# Patient Record
Sex: MALE | Race: BLACK OR AFRICAN AMERICAN | NOT HISPANIC OR LATINO | URBAN - METROPOLITAN AREA
[De-identification: names, ages, dates, MRNs, and addresses within clinical notes are randomized per-mention and may not be internally consistent; named-entity substitution may affect disease eponyms.]

---

## 2020-11-24 ENCOUNTER — EMERGENCY (EMERGENCY)
Facility: HOSPITAL | Age: 25
LOS: 0 days | Discharge: HOME | End: 2020-11-24
Payer: COMMERCIAL

## 2020-11-24 VITALS
HEART RATE: 97 BPM | DIASTOLIC BLOOD PRESSURE: 99 MMHG | RESPIRATION RATE: 16 BRPM | OXYGEN SATURATION: 97 % | WEIGHT: 229.94 LBS | HEIGHT: 70 IN | TEMPERATURE: 98 F | SYSTOLIC BLOOD PRESSURE: 150 MMHG

## 2020-11-24 PROCEDURE — 99284 EMERGENCY DEPT VISIT MOD MDM: CPT

## 2020-11-24 RX ORDER — CYCLOBENZAPRINE HYDROCHLORIDE 10 MG/1
1 TABLET, FILM COATED ORAL
Qty: 20 | Refills: 0
Start: 2020-11-24 | End: 2020-12-03

## 2020-11-24 RX ORDER — IBUPROFEN 200 MG
1 TABLET ORAL
Qty: 28 | Refills: 0
Start: 2020-11-24 | End: 2020-11-30

## 2020-11-24 RX ORDER — CYCLOBENZAPRINE HYDROCHLORIDE 10 MG/1
5 TABLET, FILM COATED ORAL ONCE
Refills: 0 | Status: COMPLETED | OUTPATIENT
Start: 2020-11-24 | End: 2020-11-24

## 2020-11-24 RX ORDER — KETOROLAC TROMETHAMINE 30 MG/ML
60 SYRINGE (ML) INJECTION ONCE
Refills: 0 | Status: DISCONTINUED | OUTPATIENT
Start: 2020-11-24 | End: 2020-11-24

## 2020-11-24 RX ADMIN — Medication 60 MILLIGRAM(S): at 20:37

## 2020-11-24 RX ADMIN — CYCLOBENZAPRINE HYDROCHLORIDE 5 MILLIGRAM(S): 10 TABLET, FILM COATED ORAL at 20:37
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
## 2020-11-24 NOTE — ED PROVIDER NOTE - CLINICAL SUMMARY MEDICAL DECISION MAKING FREE TEXT BOX
Due to lack of trauma a fracture is unlikely for this patient. No imaging will be ordered at this time. Pt is instructed to do consevative managament and f/u w a specialist in 2 days, re-evaluation and possible imaging at this point for presistent pain
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
## 2020-11-24 NOTE — ED ADULT NURSE NOTE - NSIMPLEMENTINTERV_GEN_ALL_ED
Implemented All Universal Safety Interventions:  [place] to call system. Call bell, personal items and telephone within reach. Instruct patient to call for assistance. Room bathroom lighting operational. Non-slip footwear when patient is off stretcher. Physically safe environment: no spills, clutter or unnecessary equipment. Stretcher in lowest position, wheels locked, appropriate side rails in place.
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
## 2020-11-24 NOTE — ED PROVIDER NOTE - PATIENT PORTAL LINK FT
You can access the FollowMyHealth Patient Portal offered by [facility] by registering at the following website: [email or web address]. By joining [facility]’s FollowMyHealth portal, you will also be able to view your health information using other applications (apps) compatible with our system.

## 2020-11-25 DIAGNOSIS — S16.1XXA STRAIN OF MUSCLE, FASCIA AND TENDON AT NECK LEVEL, INITIAL ENCOUNTER: ICD-10-CM
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
## 2021-03-18 ENCOUNTER — INPATIENT (INPATIENT)
Facility: HOSPITAL | Age: 26
LOS: 7 days | Discharge: HOME | End: 2021-03-26
Attending: INTERNAL MEDICINE | Admitting: INTERNAL MEDICINE
Payer: SELF-PAY

## 2021-03-18 VITALS
WEIGHT: 259.93 LBS | OXYGEN SATURATION: 99 % | TEMPERATURE: 97 F | HEIGHT: 70 IN | SYSTOLIC BLOOD PRESSURE: 139 MMHG | HEART RATE: 108 BPM | DIASTOLIC BLOOD PRESSURE: 94 MMHG | RESPIRATION RATE: 20 BRPM

## 2021-03-18 DIAGNOSIS — A41.9 SEPSIS, UNSPECIFIED ORGANISM: ICD-10-CM

## 2021-03-18 DIAGNOSIS — R65.21 SEVERE SEPSIS WITH SEPTIC SHOCK: ICD-10-CM

## 2021-03-18 DIAGNOSIS — E87.0 HYPEROSMOLALITY AND HYPERNATREMIA: ICD-10-CM

## 2021-03-18 DIAGNOSIS — E87.5 HYPERKALEMIA: ICD-10-CM

## 2021-03-18 DIAGNOSIS — U07.1 COVID-19: ICD-10-CM

## 2021-03-18 DIAGNOSIS — E86.0 DEHYDRATION: ICD-10-CM

## 2021-03-18 DIAGNOSIS — J96.01 ACUTE RESPIRATORY FAILURE WITH HYPOXIA: ICD-10-CM

## 2021-03-18 DIAGNOSIS — G93.41 METABOLIC ENCEPHALOPATHY: ICD-10-CM

## 2021-03-18 DIAGNOSIS — J15.20 PNEUMONIA DUE TO STAPHYLOCOCCUS, UNSPECIFIED: ICD-10-CM

## 2021-03-18 DIAGNOSIS — E11.11 TYPE 2 DIABETES MELLITUS WITH KETOACIDOSIS WITH COMA: ICD-10-CM

## 2021-03-18 DIAGNOSIS — E11.10 TYPE 2 DIABETES MELLITUS WITH KETOACIDOSIS WITHOUT COMA: ICD-10-CM

## 2021-03-18 DIAGNOSIS — N17.9 ACUTE KIDNEY FAILURE, UNSPECIFIED: ICD-10-CM

## 2021-03-18 LAB
ALBUMIN SERPL ELPH-MCNC: 4.2 G/DL — SIGNIFICANT CHANGE UP (ref 3.3–5)
ALP SERPL-CCNC: 102 U/L — SIGNIFICANT CHANGE UP (ref 40–120)
ALT FLD-CCNC: 29 U/L — SIGNIFICANT CHANGE UP (ref 12–78)
ANION GAP SERPL CALC-SCNC: 29 MMOL/L — HIGH (ref 5–17)
AST SERPL-CCNC: 10 U/L — LOW (ref 15–37)
BASE EXCESS BLDA CALC-SCNC: -18.4 MMOL/L — LOW (ref -2–2)
BILIRUB SERPL-MCNC: 0.5 MG/DL — SIGNIFICANT CHANGE UP (ref 0.2–1.2)
BLOOD GAS COMMENTS: SIGNIFICANT CHANGE UP
BLOOD GAS SOURCE: SIGNIFICANT CHANGE UP
BUN SERPL-MCNC: 104 MG/DL — HIGH (ref 7–23)
CALCIUM SERPL-MCNC: 9.3 MG/DL — SIGNIFICANT CHANGE UP (ref 8.5–10.1)
CHLORIDE SERPL-SCNC: 90 MMOL/L — LOW (ref 96–108)
CO2 SERPL-SCNC: 12 MMOL/L — LOW (ref 22–31)
CREAT SERPL-MCNC: 5.23 MG/DL — HIGH (ref 0.5–1.3)
GLUCOSE BLDC GLUCOMTR-MCNC: >600 MG/DL — CRITICAL HIGH (ref 70–99)
GLUCOSE BLDC GLUCOMTR-MCNC: >600 MG/DL — CRITICAL HIGH (ref 70–99)
GLUCOSE SERPL-MCNC: 1374 MG/DL — CRITICAL HIGH (ref 70–99)
HCO3 BLDA-SCNC: 9 MMOL/L — LOW (ref 21–29)
HCT VFR BLD CALC: 54.1 % — HIGH (ref 39–50)
HGB BLD-MCNC: 16 G/DL — SIGNIFICANT CHANGE UP (ref 13–17)
HOROWITZ INDEX BLDA+IHG-RTO: 0.21 — SIGNIFICANT CHANGE UP
LACTATE SERPL-SCNC: 1.4 MMOL/L — SIGNIFICANT CHANGE UP (ref 0.7–2)
MAGNESIUM SERPL-MCNC: 5.5 MG/DL — HIGH (ref 1.6–2.6)
MCHC RBC-ENTMCNC: 26.8 PG — LOW (ref 27–34)
MCHC RBC-ENTMCNC: 29.6 GM/DL — LOW (ref 32–36)
MCV RBC AUTO: 90.5 FL — SIGNIFICANT CHANGE UP (ref 80–100)
PCO2 BLDA: 25 MMHG — LOW (ref 32–46)
PH BLD: 7.18 — CRITICAL LOW (ref 7.35–7.45)
PHOSPHATE SERPL-MCNC: 8.9 MG/DL — HIGH (ref 2.5–4.5)
PLATELET # BLD AUTO: 250 K/UL — SIGNIFICANT CHANGE UP (ref 150–400)
PO2 BLDA: 120 MMHG — HIGH (ref 74–108)
POTASSIUM SERPL-MCNC: 6.7 MMOL/L — CRITICAL HIGH (ref 3.5–5.3)
POTASSIUM SERPL-SCNC: 6.7 MMOL/L — CRITICAL HIGH (ref 3.5–5.3)
PROT SERPL-MCNC: 8.5 GM/DL — HIGH (ref 6–8.3)
RBC # BLD: 5.98 M/UL — HIGH (ref 4.2–5.8)
RBC # FLD: 14.5 % — SIGNIFICANT CHANGE UP (ref 10.3–14.5)
SAO2 % BLDA: 97 % — HIGH (ref 92–96)
SODIUM SERPL-SCNC: 131 MMOL/L — LOW (ref 135–145)
TSH SERPL-MCNC: 0.6 UIU/ML — SIGNIFICANT CHANGE UP (ref 0.36–3.74)
WBC # BLD: 17.4 K/UL — HIGH (ref 3.8–10.5)
WBC # FLD AUTO: 17.4 K/UL — HIGH (ref 3.8–10.5)

## 2021-03-18 PROCEDURE — 99291 CRITICAL CARE FIRST HOUR: CPT

## 2021-03-18 PROCEDURE — 93010 ELECTROCARDIOGRAM REPORT: CPT

## 2021-03-18 RX ORDER — CALCIUM GLUCONATE 100 MG/ML
1 VIAL (ML) INTRAVENOUS ONCE
Refills: 0 | Status: COMPLETED | OUTPATIENT
Start: 2021-03-18 | End: 2021-03-18

## 2021-03-18 RX ORDER — SODIUM CHLORIDE 9 MG/ML
1000 INJECTION INTRAMUSCULAR; INTRAVENOUS; SUBCUTANEOUS ONCE
Refills: 0 | Status: COMPLETED | OUTPATIENT
Start: 2021-03-18 | End: 2021-03-18

## 2021-03-18 RX ORDER — SODIUM CHLORIDE 9 MG/ML
2000 INJECTION INTRAMUSCULAR; INTRAVENOUS; SUBCUTANEOUS ONCE
Refills: 0 | Status: COMPLETED | OUTPATIENT
Start: 2021-03-18 | End: 2021-03-18

## 2021-03-18 RX ORDER — HALOPERIDOL DECANOATE 100 MG/ML
5 INJECTION INTRAMUSCULAR ONCE
Refills: 0 | Status: COMPLETED | OUTPATIENT
Start: 2021-03-18 | End: 2021-03-18

## 2021-03-18 RX ORDER — INSULIN HUMAN 100 [IU]/ML
17 INJECTION, SOLUTION SUBCUTANEOUS
Qty: 100 | Refills: 0 | Status: DISCONTINUED | OUTPATIENT
Start: 2021-03-18 | End: 2021-03-19

## 2021-03-18 RX ADMIN — Medication 1 GRAM(S): at 00:04

## 2021-03-18 RX ADMIN — Medication 1 MILLIGRAM(S): at 22:00

## 2021-03-18 RX ADMIN — SODIUM CHLORIDE 2000 MILLILITER(S): 9 INJECTION INTRAMUSCULAR; INTRAVENOUS; SUBCUTANEOUS at 22:45

## 2021-03-18 RX ADMIN — HALOPERIDOL DECANOATE 5 MILLIGRAM(S): 100 INJECTION INTRAMUSCULAR at 23:48

## 2021-03-18 RX ADMIN — Medication 100 GRAM(S): at 23:49

## 2021-03-18 RX ADMIN — SODIUM CHLORIDE 1000 MILLILITER(S): 9 INJECTION INTRAMUSCULAR; INTRAVENOUS; SUBCUTANEOUS at 23:05

## 2021-03-18 RX ADMIN — INSULIN HUMAN 17 UNIT(S)/HR: 100 INJECTION, SOLUTION SUBCUTANEOUS at 23:49

## 2021-03-18 RX ADMIN — SODIUM CHLORIDE 1000 MILLILITER(S): 9 INJECTION INTRAMUSCULAR; INTRAVENOUS; SUBCUTANEOUS at 22:01
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
## 2021-03-18 NOTE — ED ADULT TRIAGE NOTE - CHIEF COMPLAINT QUOTE
epidural shot today fs 336 , no hx diabetics, lips dry. epidural shot today fs 336 , no hx diabetics, lips dry.20 left a/c .normal saline 150 ml.

## 2021-03-18 NOTE — ED ADULT NURSE NOTE - NSIMPLEMENTINTERV_GEN_ALL_ED
Implemented All Fall with Harm Risk Interventions:  [place] to call system. Call bell, personal items and telephone within reach. Instruct patient to call for assistance. Room bathroom lighting operational. Non-slip footwear when patient is off stretcher. Physically safe environment: no spills, clutter or unnecessary equipment. Stretcher in lowest position, wheels locked, appropriate side rails in place. Provide visual cue, wrist band, yellow gown, etc. Monitor gait and stability. Monitor for mental status changes and reorient to person, place, and time. Review medications for side effects contributing to fall risk. Reinforce activity limits and safety measures with patient and family. Provide visual clues: red socks.

## 2021-03-19 DIAGNOSIS — E11.11 TYPE 2 DIABETES MELLITUS WITH KETOACIDOSIS WITH COMA: ICD-10-CM

## 2021-03-19 PROBLEM — Z78.9 OTHER SPECIFIED HEALTH STATUS: Chronic | Status: ACTIVE | Noted: 2020-11-24

## 2021-03-19 LAB
A1C WITH ESTIMATED AVERAGE GLUCOSE RESULT: 12.5 % — HIGH (ref 4–5.6)
ANION GAP SERPL CALC-SCNC: 12 MMOL/L — SIGNIFICANT CHANGE UP (ref 5–17)
ANION GAP SERPL CALC-SCNC: 18 MMOL/L — HIGH (ref 5–17)
ANION GAP SERPL CALC-SCNC: 22 MMOL/L — HIGH (ref 5–17)
ANION GAP SERPL CALC-SCNC: 6 MMOL/L — SIGNIFICANT CHANGE UP (ref 5–17)
ANION GAP SERPL CALC-SCNC: 6 MMOL/L — SIGNIFICANT CHANGE UP (ref 5–17)
APPEARANCE UR: ABNORMAL
APPEARANCE UR: CLEAR — SIGNIFICANT CHANGE UP
BACTERIA # UR AUTO: ABNORMAL
BACTERIA # UR AUTO: ABNORMAL
BASE EXCESS BLDA CALC-SCNC: -1.1 MMOL/L — SIGNIFICANT CHANGE UP (ref -2–2)
BASE EXCESS BLDA CALC-SCNC: -19 MMOL/L — LOW (ref -2–2)
BASE EXCESS BLDA CALC-SCNC: 8.6 MMOL/L — HIGH (ref -2–2)
BASE EXCESS BLDV CALC-SCNC: -2.8 MMOL/L — LOW (ref -2–2)
BASOPHILS # BLD AUTO: 0.03 K/UL — SIGNIFICANT CHANGE UP (ref 0–0.2)
BASOPHILS NFR BLD AUTO: 0.2 % — SIGNIFICANT CHANGE UP (ref 0–2)
BILIRUB UR-MCNC: NEGATIVE — SIGNIFICANT CHANGE UP
BILIRUB UR-MCNC: NEGATIVE — SIGNIFICANT CHANGE UP
BLOOD GAS COMMENTS, VENOUS: SIGNIFICANT CHANGE UP
BLOOD GAS COMMENTS: SIGNIFICANT CHANGE UP
BLOOD GAS SOURCE: SIGNIFICANT CHANGE UP
BUN SERPL-MCNC: 100 MG/DL — HIGH (ref 7–23)
BUN SERPL-MCNC: 101 MG/DL — HIGH (ref 7–23)
BUN SERPL-MCNC: 104 MG/DL — HIGH (ref 7–23)
BUN SERPL-MCNC: 65 MG/DL — HIGH (ref 7–23)
BUN SERPL-MCNC: 96 MG/DL — HIGH (ref 7–23)
CALCIUM SERPL-MCNC: 8.2 MG/DL — LOW (ref 8.5–10.1)
CALCIUM SERPL-MCNC: 9 MG/DL — SIGNIFICANT CHANGE UP (ref 8.5–10.1)
CALCIUM SERPL-MCNC: 9.2 MG/DL — SIGNIFICANT CHANGE UP (ref 8.5–10.1)
CALCIUM SERPL-MCNC: 9.5 MG/DL — SIGNIFICANT CHANGE UP (ref 8.5–10.1)
CALCIUM SERPL-MCNC: <3 MG/DL (ref 8.5–10.1)
CHLORIDE SERPL-SCNC: 108 MMOL/L — SIGNIFICANT CHANGE UP (ref 96–108)
CHLORIDE SERPL-SCNC: 118 MMOL/L — HIGH (ref 96–108)
CHLORIDE SERPL-SCNC: 122 MMOL/L — HIGH (ref 96–108)
CHLORIDE SERPL-SCNC: 123 MMOL/L — HIGH (ref 96–108)
CHLORIDE SERPL-SCNC: 127 MMOL/L — HIGH (ref 96–108)
CK SERPL-CCNC: 3595 U/L — HIGH (ref 26–308)
CO2 SERPL-SCNC: 18 MMOL/L — LOW (ref 22–31)
CO2 SERPL-SCNC: 22 MMOL/L — SIGNIFICANT CHANGE UP (ref 22–31)
CO2 SERPL-SCNC: 24 MMOL/L — SIGNIFICANT CHANGE UP (ref 22–31)
CO2 SERPL-SCNC: 26 MMOL/L — SIGNIFICANT CHANGE UP (ref 22–31)
CO2 SERPL-SCNC: 8 MMOL/L — CRITICAL LOW (ref 22–31)
COLOR SPEC: YELLOW — SIGNIFICANT CHANGE UP
COLOR SPEC: YELLOW — SIGNIFICANT CHANGE UP
COVID-19 SPIKE DOMAIN AB INTERP: POSITIVE
COVID-19 SPIKE DOMAIN ANTIBODY RESULT: 85.6 U/ML — HIGH
CREAT SERPL-MCNC: 2.71 MG/DL — HIGH (ref 0.5–1.3)
CREAT SERPL-MCNC: 3.87 MG/DL — HIGH (ref 0.5–1.3)
CREAT SERPL-MCNC: 4.33 MG/DL — HIGH (ref 0.5–1.3)
CREAT SERPL-MCNC: 4.86 MG/DL — HIGH (ref 0.5–1.3)
CREAT SERPL-MCNC: 5 MG/DL — HIGH (ref 0.5–1.3)
DIFF PNL FLD: ABNORMAL
DIFF PNL FLD: ABNORMAL
EOSINOPHIL # BLD AUTO: 0 K/UL — SIGNIFICANT CHANGE UP (ref 0–0.5)
EOSINOPHIL NFR BLD AUTO: 0 % — SIGNIFICANT CHANGE UP (ref 0–6)
EPI CELLS # UR: SIGNIFICANT CHANGE UP
EPI CELLS # UR: SIGNIFICANT CHANGE UP
ESTIMATED AVERAGE GLUCOSE: 312 MG/DL — HIGH (ref 68–114)
GAS PNL BLDV: SIGNIFICANT CHANGE UP
GLUCOSE BLDC GLUCOMTR-MCNC: 145 MG/DL — HIGH (ref 70–99)
GLUCOSE BLDC GLUCOMTR-MCNC: 175 MG/DL — HIGH (ref 70–99)
GLUCOSE BLDC GLUCOMTR-MCNC: 191 MG/DL — HIGH (ref 70–99)
GLUCOSE BLDC GLUCOMTR-MCNC: 195 MG/DL — HIGH (ref 70–99)
GLUCOSE BLDC GLUCOMTR-MCNC: 224 MG/DL — HIGH (ref 70–99)
GLUCOSE BLDC GLUCOMTR-MCNC: 224 MG/DL — HIGH (ref 70–99)
GLUCOSE BLDC GLUCOMTR-MCNC: 238 MG/DL — HIGH (ref 70–99)
GLUCOSE BLDC GLUCOMTR-MCNC: 252 MG/DL — HIGH (ref 70–99)
GLUCOSE BLDC GLUCOMTR-MCNC: 263 MG/DL — HIGH (ref 70–99)
GLUCOSE BLDC GLUCOMTR-MCNC: 287 MG/DL — HIGH (ref 70–99)
GLUCOSE BLDC GLUCOMTR-MCNC: 292 MG/DL — HIGH (ref 70–99)
GLUCOSE BLDC GLUCOMTR-MCNC: 294 MG/DL — HIGH (ref 70–99)
GLUCOSE BLDC GLUCOMTR-MCNC: 342 MG/DL — HIGH (ref 70–99)
GLUCOSE BLDC GLUCOMTR-MCNC: 358 MG/DL — HIGH (ref 70–99)
GLUCOSE BLDC GLUCOMTR-MCNC: 443 MG/DL — HIGH (ref 70–99)
GLUCOSE BLDC GLUCOMTR-MCNC: 461 MG/DL — CRITICAL HIGH (ref 70–99)
GLUCOSE BLDC GLUCOMTR-MCNC: 489 MG/DL — CRITICAL HIGH (ref 70–99)
GLUCOSE BLDC GLUCOMTR-MCNC: >600 MG/DL — CRITICAL HIGH (ref 70–99)
GLUCOSE SERPL-MCNC: 178 MG/DL — HIGH (ref 70–99)
GLUCOSE SERPL-MCNC: 261 MG/DL — HIGH (ref 70–99)
GLUCOSE SERPL-MCNC: 431 MG/DL — HIGH (ref 70–99)
GLUCOSE SERPL-MCNC: 749 MG/DL — CRITICAL HIGH (ref 70–99)
GLUCOSE SERPL-MCNC: 834 MG/DL — CRITICAL HIGH (ref 70–99)
GLUCOSE UR QL: 1000 MG/DL
GLUCOSE UR QL: 50 MG/DL
GRAN CASTS # UR COMP ASSIST: ABNORMAL /LPF
HCO3 BLDA-SCNC: 24 MMOL/L — SIGNIFICANT CHANGE UP (ref 21–29)
HCO3 BLDA-SCNC: 33 MMOL/L — HIGH (ref 21–29)
HCO3 BLDA-SCNC: 9 MMOL/L — LOW (ref 21–29)
HCO3 BLDV-SCNC: 23 MMOL/L — SIGNIFICANT CHANGE UP (ref 21–29)
HCT VFR BLD CALC: 50.1 % — HIGH (ref 39–50)
HCT VFR BLD CALC: 51.7 % — HIGH (ref 39–50)
HGB BLD-MCNC: 16.4 G/DL — SIGNIFICANT CHANGE UP (ref 13–17)
HGB BLD-MCNC: 16.5 G/DL — SIGNIFICANT CHANGE UP (ref 13–17)
HOROWITZ INDEX BLDA+IHG-RTO: 0.21 — SIGNIFICANT CHANGE UP
HOROWITZ INDEX BLDA+IHG-RTO: 0.21 — SIGNIFICANT CHANGE UP
HOROWITZ INDEX BLDA+IHG-RTO: 100 — SIGNIFICANT CHANGE UP
HOROWITZ INDEX BLDV+IHG-RTO: 21 — SIGNIFICANT CHANGE UP
IMM GRANULOCYTES NFR BLD AUTO: 0.6 % — SIGNIFICANT CHANGE UP (ref 0–1.5)
KETONES UR-MCNC: ABNORMAL
KETONES UR-MCNC: ABNORMAL
LACTATE SERPL-SCNC: 1 MMOL/L — SIGNIFICANT CHANGE UP (ref 0.7–2)
LEUKOCYTE ESTERASE UR-ACNC: ABNORMAL
LEUKOCYTE ESTERASE UR-ACNC: NEGATIVE — SIGNIFICANT CHANGE UP
LYMPHOCYTES # BLD AUTO: 1.01 K/UL — SIGNIFICANT CHANGE UP (ref 1–3.3)
LYMPHOCYTES # BLD AUTO: 5.8 % — LOW (ref 13–44)
MAGNESIUM SERPL-MCNC: 4.1 MG/DL — HIGH (ref 1.6–2.6)
MAGNESIUM SERPL-MCNC: 5.2 MG/DL — HIGH (ref 1.6–2.6)
MAGNESIUM SERPL-MCNC: 5.4 MG/DL — HIGH (ref 1.6–2.6)
MCHC RBC-ENTMCNC: 26.5 PG — LOW (ref 27–34)
MCHC RBC-ENTMCNC: 26.8 PG — LOW (ref 27–34)
MCHC RBC-ENTMCNC: 31.7 GM/DL — LOW (ref 32–36)
MCHC RBC-ENTMCNC: 32.9 GM/DL — SIGNIFICANT CHANGE UP (ref 32–36)
MCV RBC AUTO: 81.5 FL — SIGNIFICANT CHANGE UP (ref 80–100)
MCV RBC AUTO: 83.7 FL — SIGNIFICANT CHANGE UP (ref 80–100)
MONOCYTES # BLD AUTO: 1.79 K/UL — HIGH (ref 0–0.9)
MONOCYTES NFR BLD AUTO: 10.3 % — SIGNIFICANT CHANGE UP (ref 2–14)
NEUTROPHILS # BLD AUTO: 14.47 K/UL — HIGH (ref 1.8–7.4)
NEUTROPHILS NFR BLD AUTO: 83.1 % — HIGH (ref 43–77)
NITRITE UR-MCNC: NEGATIVE — SIGNIFICANT CHANGE UP
NITRITE UR-MCNC: NEGATIVE — SIGNIFICANT CHANGE UP
NRBC # BLD: 0 /100 WBCS — SIGNIFICANT CHANGE UP (ref 0–0)
PCO2 BLDA: 27 MMHG — LOW (ref 32–46)
PCO2 BLDA: 44 MMHG — SIGNIFICANT CHANGE UP (ref 32–46)
PCO2 BLDA: 45 MMHG — SIGNIFICANT CHANGE UP (ref 32–46)
PCO2 BLDV: 45 MMHG — SIGNIFICANT CHANGE UP (ref 35–50)
PH BLD: 7.15 — CRITICAL LOW (ref 7.35–7.45)
PH BLD: 7.36 — SIGNIFICANT CHANGE UP (ref 7.35–7.45)
PH BLD: 7.48 — HIGH (ref 7.35–7.45)
PH BLDV: 7.33 — LOW (ref 7.35–7.45)
PH UR: 5 — SIGNIFICANT CHANGE UP (ref 5–8)
PH UR: 5 — SIGNIFICANT CHANGE UP (ref 5–8)
PHOSPHATE SERPL-MCNC: 1.9 MG/DL — LOW (ref 2.5–4.5)
PHOSPHATE SERPL-MCNC: 2.1 MG/DL — LOW (ref 2.5–4.5)
PHOSPHATE SERPL-MCNC: 5.2 MG/DL — HIGH (ref 2.5–4.5)
PLATELET # BLD AUTO: 193 K/UL — SIGNIFICANT CHANGE UP (ref 150–400)
PLATELET # BLD AUTO: 198 K/UL — SIGNIFICANT CHANGE UP (ref 150–400)
PO2 BLDA: 104 MMHG — SIGNIFICANT CHANGE UP (ref 74–108)
PO2 BLDA: 181 MMHG — HIGH (ref 74–108)
PO2 BLDA: 60 MMHG — LOW (ref 74–108)
PO2 BLDV: <47 MMHG — HIGH (ref 25–45)
POTASSIUM SERPL-MCNC: 3.7 MMOL/L — SIGNIFICANT CHANGE UP (ref 3.5–5.3)
POTASSIUM SERPL-MCNC: 5 MMOL/L — SIGNIFICANT CHANGE UP (ref 3.5–5.3)
POTASSIUM SERPL-MCNC: 5.1 MMOL/L — SIGNIFICANT CHANGE UP (ref 3.5–5.3)
POTASSIUM SERPL-MCNC: 5.3 MMOL/L — SIGNIFICANT CHANGE UP (ref 3.5–5.3)
POTASSIUM SERPL-MCNC: 5.7 MMOL/L — HIGH (ref 3.5–5.3)
POTASSIUM SERPL-SCNC: 3.7 MMOL/L — SIGNIFICANT CHANGE UP (ref 3.5–5.3)
POTASSIUM SERPL-SCNC: 5 MMOL/L — SIGNIFICANT CHANGE UP (ref 3.5–5.3)
POTASSIUM SERPL-SCNC: 5.1 MMOL/L — SIGNIFICANT CHANGE UP (ref 3.5–5.3)
POTASSIUM SERPL-SCNC: 5.3 MMOL/L — SIGNIFICANT CHANGE UP (ref 3.5–5.3)
POTASSIUM SERPL-SCNC: 5.7 MMOL/L — HIGH (ref 3.5–5.3)
PROCALCITONIN SERPL-MCNC: 1.12 NG/ML — HIGH (ref 0.02–0.1)
PROT UR-MCNC: 100 MG/DL
PROT UR-MCNC: 30 MG/DL
RAPID RVP RESULT: SIGNIFICANT CHANGE UP
RBC # BLD: 6.15 M/UL — HIGH (ref 4.2–5.8)
RBC # BLD: 6.18 M/UL — HIGH (ref 4.2–5.8)
RBC # FLD: 13.5 % — SIGNIFICANT CHANGE UP (ref 10.3–14.5)
RBC # FLD: 13.6 % — SIGNIFICANT CHANGE UP (ref 10.3–14.5)
RBC CASTS # UR COMP ASSIST: ABNORMAL /HPF (ref 0–4)
RBC CASTS # UR COMP ASSIST: ABNORMAL /HPF (ref 0–4)
SAO2 % BLDA: 89 % — LOW (ref 92–96)
SAO2 % BLDA: 96 % — SIGNIFICANT CHANGE UP (ref 92–96)
SAO2 % BLDA: 99 % — HIGH (ref 92–96)
SAO2 % BLDV: 70 % — SIGNIFICANT CHANGE UP (ref 67–88)
SARS-COV-2 IGG+IGM SERPL QL IA: 85.6 U/ML — HIGH
SARS-COV-2 IGG+IGM SERPL QL IA: POSITIVE
SARS-COV-2 RNA SPEC QL NAA+PROBE: SIGNIFICANT CHANGE UP
SODIUM SERPL-SCNC: 148 MMOL/L — HIGH (ref 135–145)
SODIUM SERPL-SCNC: 148 MMOL/L — HIGH (ref 135–145)
SODIUM SERPL-SCNC: 152 MMOL/L — HIGH (ref 135–145)
SODIUM SERPL-SCNC: 155 MMOL/L — HIGH (ref 135–145)
SODIUM SERPL-SCNC: 157 MMOL/L — HIGH (ref 135–145)
SP GR SPEC: 1.01 — SIGNIFICANT CHANGE UP (ref 1.01–1.02)
SP GR SPEC: 1.02 — SIGNIFICANT CHANGE UP (ref 1.01–1.02)
UROBILINOGEN FLD QL: NEGATIVE MG/DL — SIGNIFICANT CHANGE UP
UROBILINOGEN FLD QL: NEGATIVE MG/DL — SIGNIFICANT CHANGE UP
WBC # BLD: 16.68 K/UL — HIGH (ref 3.8–10.5)
WBC # BLD: 18.51 K/UL — HIGH (ref 3.8–10.5)
WBC # FLD AUTO: 16.68 K/UL — HIGH (ref 3.8–10.5)
WBC # FLD AUTO: 18.51 K/UL — HIGH (ref 3.8–10.5)
WBC UR QL: ABNORMAL
WBC UR QL: SIGNIFICANT CHANGE UP

## 2021-03-19 PROCEDURE — 71045 X-RAY EXAM CHEST 1 VIEW: CPT | Mod: 26

## 2021-03-19 PROCEDURE — 31500 INSERT EMERGENCY AIRWAY: CPT

## 2021-03-19 PROCEDURE — 70450 CT HEAD/BRAIN W/O DYE: CPT | Mod: 26

## 2021-03-19 PROCEDURE — 99291 CRITICAL CARE FIRST HOUR: CPT | Mod: 25

## 2021-03-19 RX ORDER — DEXMEDETOMIDINE HYDROCHLORIDE IN 0.9% SODIUM CHLORIDE 4 UG/ML
0.2 INJECTION INTRAVENOUS
Qty: 200 | Refills: 0 | Status: DISCONTINUED | OUTPATIENT
Start: 2021-03-19 | End: 2021-03-22

## 2021-03-19 RX ORDER — PROPOFOL 10 MG/ML
70 INJECTION, EMULSION INTRAVENOUS ONCE
Refills: 0 | Status: COMPLETED | OUTPATIENT
Start: 2021-03-19 | End: 2021-03-19

## 2021-03-19 RX ORDER — SODIUM CHLORIDE 9 MG/ML
1000 INJECTION, SOLUTION INTRAVENOUS
Refills: 0 | Status: DISCONTINUED | OUTPATIENT
Start: 2021-03-19 | End: 2021-03-19

## 2021-03-19 RX ORDER — CHLORHEXIDINE GLUCONATE 213 G/1000ML
1 SOLUTION TOPICAL DAILY
Refills: 0 | Status: DISCONTINUED | OUTPATIENT
Start: 2021-03-19 | End: 2021-03-22

## 2021-03-19 RX ORDER — SODIUM CHLORIDE 9 MG/ML
1000 INJECTION INTRAMUSCULAR; INTRAVENOUS; SUBCUTANEOUS ONCE
Refills: 0 | Status: COMPLETED | OUTPATIENT
Start: 2021-03-19 | End: 2021-03-19

## 2021-03-19 RX ORDER — HEPARIN SODIUM 5000 [USP'U]/ML
5000 INJECTION INTRAVENOUS; SUBCUTANEOUS EVERY 8 HOURS
Refills: 0 | Status: DISCONTINUED | OUTPATIENT
Start: 2021-03-19 | End: 2021-03-26

## 2021-03-19 RX ORDER — HYDRALAZINE HCL 50 MG
5 TABLET ORAL ONCE
Refills: 0 | Status: COMPLETED | OUTPATIENT
Start: 2021-03-19 | End: 2021-03-19

## 2021-03-19 RX ORDER — CEFEPIME 1 G/1
2000 INJECTION, POWDER, FOR SOLUTION INTRAMUSCULAR; INTRAVENOUS EVERY 24 HOURS
Refills: 0 | Status: DISCONTINUED | OUTPATIENT
Start: 2021-03-20 | End: 2021-03-23

## 2021-03-19 RX ORDER — INSULIN HUMAN 100 [IU]/ML
12 INJECTION, SOLUTION SUBCUTANEOUS
Qty: 100 | Refills: 0 | Status: DISCONTINUED | OUTPATIENT
Start: 2021-03-19 | End: 2021-03-19

## 2021-03-19 RX ORDER — PROPOFOL 10 MG/ML
20 INJECTION, EMULSION INTRAVENOUS
Qty: 1000 | Refills: 0 | Status: DISCONTINUED | OUTPATIENT
Start: 2021-03-19 | End: 2021-03-20

## 2021-03-19 RX ORDER — SODIUM CHLORIDE 9 MG/ML
1000 INJECTION, SOLUTION INTRAVENOUS
Refills: 0 | Status: DISCONTINUED | OUTPATIENT
Start: 2021-03-19 | End: 2021-03-20

## 2021-03-19 RX ORDER — VANCOMYCIN HCL 1 G
1500 VIAL (EA) INTRAVENOUS ONCE
Refills: 0 | Status: COMPLETED | OUTPATIENT
Start: 2021-03-19 | End: 2021-03-19

## 2021-03-19 RX ORDER — HYDRALAZINE HCL 50 MG
10 TABLET ORAL EVERY 6 HOURS
Refills: 0 | Status: DISCONTINUED | OUTPATIENT
Start: 2021-03-19 | End: 2021-03-19

## 2021-03-19 RX ORDER — HALOPERIDOL DECANOATE 100 MG/ML
5 INJECTION INTRAMUSCULAR ONCE
Refills: 0 | Status: COMPLETED | OUTPATIENT
Start: 2021-03-19 | End: 2021-03-19

## 2021-03-19 RX ORDER — CHLORHEXIDINE GLUCONATE 213 G/1000ML
15 SOLUTION TOPICAL EVERY 12 HOURS
Refills: 0 | Status: DISCONTINUED | OUTPATIENT
Start: 2021-03-19 | End: 2021-03-20

## 2021-03-19 RX ORDER — HEPARIN SODIUM 5000 [USP'U]/ML
5000 INJECTION INTRAVENOUS; SUBCUTANEOUS EVERY 8 HOURS
Refills: 0 | Status: DISCONTINUED | OUTPATIENT
Start: 2021-03-19 | End: 2021-03-19

## 2021-03-19 RX ORDER — INSULIN HUMAN 100 [IU]/ML
10 INJECTION, SOLUTION SUBCUTANEOUS
Qty: 100 | Refills: 0 | Status: DISCONTINUED | OUTPATIENT
Start: 2021-03-19 | End: 2021-03-22

## 2021-03-19 RX ORDER — PANTOPRAZOLE SODIUM 20 MG/1
40 TABLET, DELAYED RELEASE ORAL DAILY
Refills: 0 | Status: DISCONTINUED | OUTPATIENT
Start: 2021-03-19 | End: 2021-03-26

## 2021-03-19 RX ORDER — CHLORHEXIDINE GLUCONATE 213 G/1000ML
1 SOLUTION TOPICAL
Refills: 0 | Status: DISCONTINUED | OUTPATIENT
Start: 2021-03-19 | End: 2021-03-19

## 2021-03-19 RX ORDER — CEFEPIME 1 G/1
2000 INJECTION, POWDER, FOR SOLUTION INTRAMUSCULAR; INTRAVENOUS ONCE
Refills: 0 | Status: COMPLETED | OUTPATIENT
Start: 2021-03-19 | End: 2021-03-19

## 2021-03-19 RX ORDER — INSULIN HUMAN 100 [IU]/ML
10 INJECTION, SOLUTION SUBCUTANEOUS
Qty: 100 | Refills: 0 | Status: DISCONTINUED | OUTPATIENT
Start: 2021-03-19 | End: 2021-03-19

## 2021-03-19 RX ORDER — SODIUM CHLORIDE 9 MG/ML
2000 INJECTION INTRAMUSCULAR; INTRAVENOUS; SUBCUTANEOUS ONCE
Refills: 0 | Status: COMPLETED | OUTPATIENT
Start: 2021-03-19 | End: 2021-03-19

## 2021-03-19 RX ORDER — PROPOFOL 10 MG/ML
100 INJECTION, EMULSION INTRAVENOUS ONCE
Refills: 0 | Status: COMPLETED | OUTPATIENT
Start: 2021-03-19 | End: 2021-03-19

## 2021-03-19 RX ORDER — CEFEPIME 1 G/1
INJECTION, POWDER, FOR SOLUTION INTRAMUSCULAR; INTRAVENOUS
Refills: 0 | Status: DISCONTINUED | OUTPATIENT
Start: 2021-03-19 | End: 2021-03-23

## 2021-03-19 RX ORDER — NOREPINEPHRINE BITARTRATE/D5W 8 MG/250ML
0.05 PLASTIC BAG, INJECTION (ML) INTRAVENOUS
Qty: 8 | Refills: 0 | Status: DISCONTINUED | OUTPATIENT
Start: 2021-03-19 | End: 2021-03-20

## 2021-03-19 RX ORDER — ACETAMINOPHEN 500 MG
1000 TABLET ORAL ONCE
Refills: 0 | Status: COMPLETED | OUTPATIENT
Start: 2021-03-19 | End: 2021-03-19

## 2021-03-19 RX ADMIN — DEXMEDETOMIDINE HYDROCHLORIDE IN 0.9% SODIUM CHLORIDE 5.9 MICROGRAM(S)/KG/HR: 4 INJECTION INTRAVENOUS at 17:40

## 2021-03-19 RX ADMIN — INSULIN HUMAN 10 UNIT(S)/HR: 100 INJECTION, SOLUTION SUBCUTANEOUS at 06:01

## 2021-03-19 RX ADMIN — PROPOFOL 14.1 MICROGRAM(S)/KG/MIN: 10 INJECTION, EMULSION INTRAVENOUS at 17:39

## 2021-03-19 RX ADMIN — HEPARIN SODIUM 5000 UNIT(S): 5000 INJECTION INTRAVENOUS; SUBCUTANEOUS at 05:41

## 2021-03-19 RX ADMIN — SODIUM CHLORIDE 150 MILLILITER(S): 9 INJECTION, SOLUTION INTRAVENOUS at 17:49

## 2021-03-19 RX ADMIN — SODIUM CHLORIDE 2000 MILLILITER(S): 9 INJECTION INTRAMUSCULAR; INTRAVENOUS; SUBCUTANEOUS at 00:50

## 2021-03-19 RX ADMIN — Medication 5 MILLIGRAM(S): at 06:45

## 2021-03-19 RX ADMIN — SODIUM CHLORIDE 150 MILLILITER(S): 9 INJECTION, SOLUTION INTRAVENOUS at 11:38

## 2021-03-19 RX ADMIN — INSULIN HUMAN 10 UNIT(S)/HR: 100 INJECTION, SOLUTION SUBCUTANEOUS at 17:40

## 2021-03-19 RX ADMIN — PROPOFOL 70 MILLIGRAM(S): 10 INJECTION, EMULSION INTRAVENOUS at 23:40

## 2021-03-19 RX ADMIN — DEXMEDETOMIDINE HYDROCHLORIDE IN 0.9% SODIUM CHLORIDE 5.9 MICROGRAM(S)/KG/HR: 4 INJECTION INTRAVENOUS at 08:41

## 2021-03-19 RX ADMIN — Medication 400 MILLIGRAM(S): at 22:12

## 2021-03-19 RX ADMIN — PROPOFOL 100 MILLIGRAM(S): 10 INJECTION, EMULSION INTRAVENOUS at 16:00

## 2021-03-19 RX ADMIN — CHLORHEXIDINE GLUCONATE 1 APPLICATION(S): 213 SOLUTION TOPICAL at 05:39

## 2021-03-19 RX ADMIN — HALOPERIDOL DECANOATE 5 MILLIGRAM(S): 100 INJECTION INTRAMUSCULAR at 06:53

## 2021-03-19 RX ADMIN — SODIUM CHLORIDE 150 MILLILITER(S): 9 INJECTION, SOLUTION INTRAVENOUS at 22:13

## 2021-03-19 RX ADMIN — DEXMEDETOMIDINE HYDROCHLORIDE IN 0.9% SODIUM CHLORIDE 5.9 MICROGRAM(S)/KG/HR: 4 INJECTION INTRAVENOUS at 13:44

## 2021-03-19 RX ADMIN — Medication 1000 MILLIGRAM(S): at 22:45

## 2021-03-19 RX ADMIN — HEPARIN SODIUM 5000 UNIT(S): 5000 INJECTION INTRAVENOUS; SUBCUTANEOUS at 14:11

## 2021-03-19 RX ADMIN — CHLORHEXIDINE GLUCONATE 15 MILLILITER(S): 213 SOLUTION TOPICAL at 17:39

## 2021-03-19 RX ADMIN — Medication 5 MILLIGRAM(S): at 06:28

## 2021-03-19 RX ADMIN — INSULIN HUMAN 12 UNIT(S)/HR: 100 INJECTION, SOLUTION SUBCUTANEOUS at 06:53

## 2021-03-19 RX ADMIN — SODIUM CHLORIDE 2000 MILLILITER(S): 9 INJECTION INTRAMUSCULAR; INTRAVENOUS; SUBCUTANEOUS at 01:05

## 2021-03-19 RX ADMIN — Medication 8.9 MICROGRAM(S)/KG/MIN: at 22:40

## 2021-03-19 RX ADMIN — SODIUM CHLORIDE 125 MILLILITER(S): 9 INJECTION, SOLUTION INTRAVENOUS at 05:08

## 2021-03-19 RX ADMIN — SODIUM CHLORIDE 999 MILLILITER(S): 9 INJECTION, SOLUTION INTRAVENOUS at 23:00

## 2021-03-19 RX ADMIN — SODIUM CHLORIDE 1000 MILLILITER(S): 9 INJECTION INTRAMUSCULAR; INTRAVENOUS; SUBCUTANEOUS at 21:00

## 2021-03-19 RX ADMIN — Medication 2 MILLIGRAM(S): at 04:00

## 2021-03-19 RX ADMIN — Medication 300 MILLIGRAM(S): at 11:37

## 2021-03-19 RX ADMIN — DEXMEDETOMIDINE HYDROCHLORIDE IN 0.9% SODIUM CHLORIDE 5.9 MICROGRAM(S)/KG/HR: 4 INJECTION INTRAVENOUS at 09:39

## 2021-03-19 RX ADMIN — HEPARIN SODIUM 5000 UNIT(S): 5000 INJECTION INTRAVENOUS; SUBCUTANEOUS at 21:30

## 2021-03-19 RX ADMIN — DEXMEDETOMIDINE HYDROCHLORIDE IN 0.9% SODIUM CHLORIDE 5.9 MICROGRAM(S)/KG/HR: 4 INJECTION INTRAVENOUS at 11:38

## 2021-03-19 RX ADMIN — CEFEPIME 100 MILLIGRAM(S): 1 INJECTION, POWDER, FOR SOLUTION INTRAMUSCULAR; INTRAVENOUS at 11:37

## 2021-03-19 NOTE — CHART NOTE - NSCHARTNOTEFT_GEN_A_CORE
Patient noted to desaturate. Minimally responsive to sternal rub. Not following commands. Patient improved with bagging then BiPAP, however decision was made to intubate patient for further workup of hypoxemia and altered mental status.
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
## 2021-03-19 NOTE — H&P ADULT - HISTORY OF PRESENT ILLNESS
25M with no know medical history  p/f AMS, per family pt been acting more fatigued last several days, became worse today, seemed lethargic so called EMS. Found to be hyperglycemic to 300s by EMS, IN ED patients Blood glucose 1374.     25M with no know medical history  p/f AMS, per family pt been acting more fatigued last several days, became worse today, seemed lethargic so called EMS. Found to be hyperglycemic to 300s by EMS, IN ED patients Blood glucose 1374.   IN ED patient was agitated received haldol/ ativan X1

## 2021-03-19 NOTE — PATIENT PROFILE ADULT - TRANSPORTATION
Telephone Encounter by Genet London RN at 07/11/18 08:15 AM     Author:  Genet London RN Service:  (none) Author Type:  Registered Nurse     Filed:  07/11/18 08:15 AM Encounter Date:  6/29/2018 Status:  Signed     :  Genet London RN (Registered Nurse)            Referral faxed. [AA1.1M]      Revision History        User Key Date/Time User Provider Type Action    > AA1.1 07/11/18 08:15 AM Genet London RN Registered Nurse Sign    M - Manual no

## 2021-03-19 NOTE — H&P ADULT - ATTENDING COMMENTS
24 y/o M admitted for new onset DKA and AMS. ASHLEE likely pre-renal vs ATN. Leukocytosis likely secondary to DKA, however possible infection.    - Precedex as needed for agitation  - Insulin gtt  - Empiric abx  - IV fluids  - Trend Cr, avoid nephrotoxins    I have personally provided 35 minutes of attending critical care time independent of NP/PA critical care time and excluding procedures. 24 y/o M admitted for new onset DKA and AMS. ASHLEE likely pre-renal vs ATN. Leukocytosis likely secondary to DKA, however possible infection. Hypernatremia.    - Precedex as needed for agitation  - Insulin gtt  - Empiric abx  - IV fluids  - Trend Cr, avoid nephrotoxins    I have personally provided 35 minutes of attending critical care time independent of NP/PA critical care time and excluding procedures.

## 2021-03-19 NOTE — PATIENT PROFILE ADULT - NSPROPOAPRESSUREINJURY_GEN_A_NUR
.  Last office visit 7/17/2020     Last written [phone] w with 1      Next office visit scheduled 1/18/2021    Requested Prescriptions     Pending Prescriptions Disp Refills    spironolactone (ALDACTONE) 50 MG tablet [Pharmacy Med Name: SPIRONOLACTONE 50 MG TABLET] 90 tablet 1     Sig: TAKE 1 TABLET BY MOUTH EVERY DAY     f no

## 2021-03-19 NOTE — H&P ADULT - NSHPLABSRESULTS_GEN_ALL_CORE
LABS:                          16.0   17.40 )-----------( 250      ( 18 Mar 2021 22:37 )             54.1     03-18    131<L>  |  90<L>  |  104<H>  ----------------------------<  1374<HH>  6.7<HH>   |  12<L>  |  5.23<H>    Ca    9.3      18 Mar 2021 22:37  Phos  8.9     03-18  Mg     5.5     03-18    TPro  8.5<H>  /  Alb  4.2  /  TBili  0.5  /  DBili  x   /  AST  10<L>  /  ALT  29  /  AlkPhos  102  03-18    LIVER FUNCTIONS - ( 18 Mar 2021 22:37 )  Alb: 4.2 g/dL / Pro: 8.5 gm/dL / ALK PHOS: 102 U/L / ALT: 29 U/L / AST: 10 U/L / GGT: x         thy    Urinalysis Basic - ( 19 Mar 2021 01:11 )    Color: Yellow / Appearance: Clear / S.015 / pH: x  Gluc: x / Ketone: Moderate  / Bili: Negative / Urobili: Negative mg/dL   Blood: x / Protein: 30 mg/dL / Nitrite: Negative   Leuk Esterase: Negative / RBC: x / WBC x   Sq Epi: x / Non Sq Epi: x / Bacteria: x

## 2021-03-19 NOTE — H&P ADULT - NSHPPHYSICALEXAM_GEN_ALL_CORE
GENERAL: LETHARGIC   HEAD:  Atraumatic, Normocephalic  EYES: EOMI, PERRLA, conjunctiva and sclera clear  ENMT: No tonsillar erythema, exudates, or enlargement; Moist mucous membranes, Good dentition, No lesions  NECK: Supple, No JVD, Normal thyroid  NERVOUS SYSTEM:  Alert & Oriented X3, Good concentration; Motor Strength 5/5 B/L upper and lower extremities; DTRs 2+ intact and symmetric  CHEST/LUNG: Clear to percussion bilaterally; No rales, rhonchi, wheezing, or rubs  HEART: Regular rate and rhythm; No murmurs, rubs, or gallops  ABDOMEN: Soft, Nontender, Nondistended; Bowel sounds present  EXTREMITIES:  2+ Peripheral Pulses, No clubbing, cyanosis, or edema  LYMPH: No lymphadenopathy noted  SKIN: No rashes or lesions

## 2021-03-19 NOTE — ED ADULT NURSE REASSESSMENT NOTE - NS ED NURSE REASSESS COMMENT FT1
Pt is admitted to ICU, ICU  informed me that the icu overflow is going to 2d. FARIDEH Webb asked for the pt to be held in ED until COVID status returned

## 2021-03-19 NOTE — H&P ADULT - ASSESSMENT
ASSESSMENT :   ==============  25 year old male with no significant medical history presents with AMS r/t DKA       PLAN:  ========    NEURO:  -nuero checks per routine     PULM:   - nasal cannula 2 L      -maintain O2 > 92    CV:  -Maintain MAP > 65    GI:  -NPO     RENAL:  -ASHLEE likely 2nd to DKA -unknown baseline   -continue LR @ 125  -monitor electrolytes   -maintain urine output > 0.5cc/kg/hr     -hyperkalemia - continue to trend     :  VALDEZ yes [  ] NO X[  ] insertion date     ID:  -+ leukocytosis /trend WBC --> / dehydration   - no obvious source of infection     -COVID  PENDING   - may need ABX     ENDO:  On insulin drip, titrate per MICU protocol  - Q1H accuchecks  - aggressive fluid hydration with LR @125, goal UOP >0.5 cc/kg/hr  -Q4-6H BMP, magnesium, phosphorous level, acetone  -Repeat ABG in AM   -when blood sugar <250 add dextrose to IVF, consider change to D5 LR  -when BMP shows closure of anion gap, add long acting insulin (lantus)  -Once lantus given, continue insulin drip for 1 hour, and then begin using sliding scale insulin w/ meals and at bedtime   -Diabetic education and counseling  -send HbA1C    HEME:  -stable   - heparin SQ            ASSESSMENT :   ==============  25 year old male with no significant medical history presents with AMS r/t DKA       PLAN:  ========    NEURO:  -nuero checks per routine   -currently sleeping after haldol/ativan  - periods of agitation pulling at lines and tubes  -CT head pending     PULM:   - nasal cannula 2 L      -maintain O2 > 92    CV:  -Maintain MAP > 65    GI:  -NPO     RENAL:  -ASHLEE likely 2nd to DKA -unknown baseline   -continue LR @ 125  -monitor electrolytes   -maintain urine output > 0.5cc/kg/hr     -hyperkalemia - continue to trend     :  VALDEZ yes [  ] NO X[  ] insertion date     ID:  -+ leukocytosis /trend WBC --> / dehydration   - no obvious source of infection     -COVID  PENDING   - may need ABX     ENDO:  On insulin drip, titrate per MICU protocol  - Q1H accuchecks  - aggressive fluid hydration with LR @125, goal UOP >0.5 cc/kg/hr  -Q4-6H BMP, magnesium, phosphorous level, acetone  -Repeat ABG in AM   -when blood sugar <250 add dextrose to IVF, consider change to D5 LR  -when BMP shows closure of anion gap, add long acting insulin (lantus)  -Once lantus given, continue insulin drip for 1 hour, and then begin using sliding scale insulin w/ meals and at bedtime   -Diabetic education and counseling  -send HbA1C    HEME:  -stable   - heparin SQ

## 2021-03-19 NOTE — ED PROVIDER NOTE - CLINICAL SUMMARY MEDICAL DECISION MAKING FREE TEXT BOX
25M no formal DM hx p/f AMS, found to be hyperglycemic to 1000+ in ED, likely new onset DM, unclear if infection driven, Gap 29 on initial labs, insulin gtt started along with aggressive IVF, broad spectrum ABX and cultures drawn, ICU aware, will admit and continue to monitor. No external signs of trauma concerning for traumatic etiology. 25M no formal DM hx p/f AMS, found to be hyperglycemic to 1000+ in ED, likely new onset DM, unclear if infection driven, Gap 29 on initial labs, insulin gtt started along with aggressive IVF, cultures drawn, ICU aware, will admit and continue to monitor. No external signs of trauma concerning for traumatic etiology.

## 2021-03-19 NOTE — ED PROVIDER NOTE - CARE PLAN
Principal Discharge DX:	DKA (diabetic ketoacidoses)  Secondary Diagnosis:	Hyperglycemic crisis in diabetes mellitus

## 2021-03-20 DIAGNOSIS — E10.10 TYPE 1 DIABETES MELLITUS WITH KETOACIDOSIS WITHOUT COMA: ICD-10-CM

## 2021-03-20 LAB
4/8 RATIO: 1.12 RATIO — SIGNIFICANT CHANGE UP (ref 0.9–3.6)
4/8 RATIO: 1.26 RATIO — SIGNIFICANT CHANGE UP (ref 0.9–3.6)
ABS CD8: 255 /UL — SIGNIFICANT CHANGE UP (ref 142–740)
ABS CD8: 325 /UL — SIGNIFICANT CHANGE UP (ref 142–740)
ALBUMIN SERPL ELPH-MCNC: 2.8 G/DL — LOW (ref 3.3–5)
ALBUMIN SERPL ELPH-MCNC: 3 G/DL — LOW (ref 3.3–5)
ALP SERPL-CCNC: 66 U/L — SIGNIFICANT CHANGE UP (ref 40–120)
ALP SERPL-CCNC: 69 U/L — SIGNIFICANT CHANGE UP (ref 40–120)
ALT FLD-CCNC: 30 U/L — SIGNIFICANT CHANGE UP (ref 12–78)
ALT FLD-CCNC: 39 U/L — SIGNIFICANT CHANGE UP (ref 12–78)
ANION GAP SERPL CALC-SCNC: 4 MMOL/L — LOW (ref 5–17)
ANION GAP SERPL CALC-SCNC: 5 MMOL/L — SIGNIFICANT CHANGE UP (ref 5–17)
AST SERPL-CCNC: 70 U/L — HIGH (ref 15–37)
AST SERPL-CCNC: 99 U/L — HIGH (ref 15–37)
BASE EXCESS BLDA CALC-SCNC: -4 MMOL/L — LOW (ref -2–2)
BILIRUB SERPL-MCNC: 0.3 MG/DL — SIGNIFICANT CHANGE UP (ref 0.2–1.2)
BILIRUB SERPL-MCNC: 0.5 MG/DL — SIGNIFICANT CHANGE UP (ref 0.2–1.2)
BLOOD GAS COMMENTS: SIGNIFICANT CHANGE UP
BLOOD GAS COMMENTS: SIGNIFICANT CHANGE UP
BLOOD GAS SOURCE: SIGNIFICANT CHANGE UP
BUN SERPL-MCNC: 79 MG/DL — HIGH (ref 7–23)
BUN SERPL-MCNC: 92 MG/DL — HIGH (ref 7–23)
CALCIUM SERPL-MCNC: 7.8 MG/DL — LOW (ref 8.5–10.1)
CALCIUM SERPL-MCNC: 7.9 MG/DL — LOW (ref 8.5–10.1)
CD16+CD56+ CELLS NFR BLD: 3 % — LOW (ref 5–23)
CD16+CD56+ CELLS NFR SPEC: 38 /UL — LOW (ref 71–410)
CD19 BLASTS SPEC-ACNC: 22 % — SIGNIFICANT CHANGE UP (ref 6–24)
CD19 BLASTS SPEC-ACNC: 287 /UL — SIGNIFICANT CHANGE UP (ref 84–469)
CD3 BLASTS SPEC-ACNC: 616 /UL — LOW (ref 672–1870)
CD3 BLASTS SPEC-ACNC: 65 % — SIGNIFICANT CHANGE UP (ref 59–83)
CD3 BLASTS SPEC-ACNC: 76 % — SIGNIFICANT CHANGE UP (ref 59–83)
CD3 BLASTS SPEC-ACNC: 870 /UL — SIGNIFICANT CHANGE UP (ref 672–1870)
CD4 %: 30 % — SIGNIFICANT CHANGE UP (ref 30–62)
CD4 %: 40 % — SIGNIFICANT CHANGE UP (ref 30–62)
CD8 %: 27 % — SIGNIFICANT CHANGE UP (ref 12–36)
CD8 %: 32 % — SIGNIFICANT CHANGE UP (ref 12–36)
CHLORIDE SERPL-SCNC: 126 MMOL/L — HIGH (ref 96–108)
CHLORIDE SERPL-SCNC: 127 MMOL/L — HIGH (ref 96–108)
CO2 SERPL-SCNC: 23 MMOL/L — SIGNIFICANT CHANGE UP (ref 22–31)
CO2 SERPL-SCNC: 24 MMOL/L — SIGNIFICANT CHANGE UP (ref 22–31)
CREAT ?TM UR-MCNC: 454 MG/DL — SIGNIFICANT CHANGE UP
CREAT SERPL-MCNC: 3.11 MG/DL — HIGH (ref 0.5–1.3)
CREAT SERPL-MCNC: 4.48 MG/DL — HIGH (ref 0.5–1.3)
CULTURE RESULTS: SIGNIFICANT CHANGE UP
ERYTHROCYTE [SEDIMENTATION RATE] IN BLOOD: 20 MM/HR — HIGH (ref 0–15)
GLUCOSE BLDC GLUCOMTR-MCNC: 171 MG/DL — HIGH (ref 70–99)
GLUCOSE BLDC GLUCOMTR-MCNC: 181 MG/DL — HIGH (ref 70–99)
GLUCOSE BLDC GLUCOMTR-MCNC: 194 MG/DL — HIGH (ref 70–99)
GLUCOSE BLDC GLUCOMTR-MCNC: 218 MG/DL — HIGH (ref 70–99)
GLUCOSE BLDC GLUCOMTR-MCNC: 230 MG/DL — HIGH (ref 70–99)
GLUCOSE BLDC GLUCOMTR-MCNC: 231 MG/DL — HIGH (ref 70–99)
GLUCOSE BLDC GLUCOMTR-MCNC: 240 MG/DL — HIGH (ref 70–99)
GLUCOSE BLDC GLUCOMTR-MCNC: 244 MG/DL — HIGH (ref 70–99)
GLUCOSE BLDC GLUCOMTR-MCNC: 244 MG/DL — HIGH (ref 70–99)
GLUCOSE BLDC GLUCOMTR-MCNC: 247 MG/DL — HIGH (ref 70–99)
GLUCOSE BLDC GLUCOMTR-MCNC: 262 MG/DL — HIGH (ref 70–99)
GLUCOSE BLDC GLUCOMTR-MCNC: 271 MG/DL — HIGH (ref 70–99)
GLUCOSE BLDC GLUCOMTR-MCNC: 275 MG/DL — HIGH (ref 70–99)
GLUCOSE BLDC GLUCOMTR-MCNC: 284 MG/DL — HIGH (ref 70–99)
GLUCOSE BLDC GLUCOMTR-MCNC: 286 MG/DL — HIGH (ref 70–99)
GLUCOSE BLDC GLUCOMTR-MCNC: 290 MG/DL — HIGH (ref 70–99)
GLUCOSE BLDC GLUCOMTR-MCNC: 297 MG/DL — HIGH (ref 70–99)
GLUCOSE BLDC GLUCOMTR-MCNC: 302 MG/DL — HIGH (ref 70–99)
GLUCOSE BLDC GLUCOMTR-MCNC: 328 MG/DL — HIGH (ref 70–99)
GLUCOSE BLDC GLUCOMTR-MCNC: 339 MG/DL — HIGH (ref 70–99)
GLUCOSE BLDC GLUCOMTR-MCNC: 357 MG/DL — HIGH (ref 70–99)
GLUCOSE BLDC GLUCOMTR-MCNC: 362 MG/DL — HIGH (ref 70–99)
GLUCOSE BLDC GLUCOMTR-MCNC: 385 MG/DL — HIGH (ref 70–99)
GLUCOSE SERPL-MCNC: 198 MG/DL — HIGH (ref 70–99)
GLUCOSE SERPL-MCNC: 286 MG/DL — HIGH (ref 70–99)
GRAM STN FLD: SIGNIFICANT CHANGE UP
HAV IGM SER-ACNC: SIGNIFICANT CHANGE UP
HBV CORE IGM SER-ACNC: SIGNIFICANT CHANGE UP
HBV SURFACE AG SER-ACNC: SIGNIFICANT CHANGE UP
HCO3 BLDA-SCNC: 21 MMOL/L — SIGNIFICANT CHANGE UP (ref 21–29)
HCT VFR BLD CALC: 38.8 % — LOW (ref 39–50)
HCV AB S/CO SERPL IA: 0.07 S/CO — SIGNIFICANT CHANGE UP (ref 0–0.99)
HCV AB SERPL-IMP: SIGNIFICANT CHANGE UP
HGB BLD-MCNC: 12.3 G/DL — LOW (ref 13–17)
HIV 1+2 AB+HIV1 P24 AG SERPL QL IA: SIGNIFICANT CHANGE UP
HOROWITZ INDEX BLDA+IHG-RTO: 40 — SIGNIFICANT CHANGE UP
MAGNESIUM SERPL-MCNC: 3.8 MG/DL — HIGH (ref 1.6–2.6)
MCHC RBC-ENTMCNC: 26.7 PG — LOW (ref 27–34)
MCHC RBC-ENTMCNC: 31.7 GM/DL — LOW (ref 32–36)
MCV RBC AUTO: 84.2 FL — SIGNIFICANT CHANGE UP (ref 80–100)
MRSA PCR RESULT.: SIGNIFICANT CHANGE UP
NRBC # BLD: 0 /100 WBCS — SIGNIFICANT CHANGE UP (ref 0–0)
PCO2 BLDA: 38 MMHG — SIGNIFICANT CHANGE UP (ref 32–46)
PH BLD: 7.35 — SIGNIFICANT CHANGE UP (ref 7.35–7.45)
PHOSPHATE SERPL-MCNC: 2.1 MG/DL — LOW (ref 2.5–4.5)
PLATELET # BLD AUTO: 122 K/UL — LOW (ref 150–400)
PO2 BLDA: 135 MMHG — HIGH (ref 74–108)
POTASSIUM SERPL-MCNC: 4.8 MMOL/L — SIGNIFICANT CHANGE UP (ref 3.5–5.3)
POTASSIUM SERPL-MCNC: 4.9 MMOL/L — SIGNIFICANT CHANGE UP (ref 3.5–5.3)
POTASSIUM SERPL-SCNC: 4.8 MMOL/L — SIGNIFICANT CHANGE UP (ref 3.5–5.3)
POTASSIUM SERPL-SCNC: 4.9 MMOL/L — SIGNIFICANT CHANGE UP (ref 3.5–5.3)
PROCALCITONIN SERPL-MCNC: 0.68 NG/ML — HIGH (ref 0.02–0.1)
PROT ?TM UR-MCNC: 199 MG/DL — HIGH (ref 0–12)
PROT SERPL-MCNC: 6 GM/DL — SIGNIFICANT CHANGE UP (ref 6–8.3)
PROT SERPL-MCNC: 6.3 GM/DL — SIGNIFICANT CHANGE UP (ref 6–8.3)
PROT/CREAT UR-RTO: 0.4 RATIO — HIGH (ref 0–0.2)
PTH-INTACT FLD-MCNC: 192 PG/ML — HIGH (ref 15–65)
RBC # BLD: 4.61 M/UL — SIGNIFICANT CHANGE UP (ref 4.2–5.8)
RBC # FLD: 13.7 % — SIGNIFICANT CHANGE UP (ref 10.3–14.5)
S AUREUS DNA NOSE QL NAA+PROBE: DETECTED
SAO2 % BLDA: 99 % — HIGH (ref 92–96)
SODIUM SERPL-SCNC: 154 MMOL/L — HIGH (ref 135–145)
SODIUM SERPL-SCNC: 155 MMOL/L — HIGH (ref 135–145)
SPECIMEN SOURCE: SIGNIFICANT CHANGE UP
SPECIMEN SOURCE: SIGNIFICANT CHANGE UP
T PALLIDUM AB TITR SER: NEGATIVE — SIGNIFICANT CHANGE UP
T-CELL CD4 SUBSET PNL BLD: 286 /UL — LOW (ref 489–1457)
T-CELL CD4 SUBSET PNL BLD: 410 /UL — LOW (ref 489–1457)
TSH SERPL-MCNC: 0.39 UU/ML — SIGNIFICANT CHANGE UP (ref 0.36–3.74)
VANCOMYCIN FLD-MCNC: 10.9 UG/ML — SIGNIFICANT CHANGE UP
WBC # BLD: 10.15 K/UL — SIGNIFICANT CHANGE UP (ref 3.8–10.5)
WBC # FLD AUTO: 10.15 K/UL — SIGNIFICANT CHANGE UP (ref 3.8–10.5)

## 2021-03-20 PROCEDURE — 99291 CRITICAL CARE FIRST HOUR: CPT

## 2021-03-20 RX ORDER — NYSTATIN CREAM 100000 [USP'U]/G
1 CREAM TOPICAL
Refills: 0 | Status: DISCONTINUED | OUTPATIENT
Start: 2021-03-20 | End: 2021-03-26

## 2021-03-20 RX ORDER — SODIUM CHLORIDE 9 MG/ML
1000 INJECTION INTRAMUSCULAR; INTRAVENOUS; SUBCUTANEOUS ONCE
Refills: 0 | Status: COMPLETED | OUTPATIENT
Start: 2021-03-20 | End: 2021-03-20

## 2021-03-20 RX ORDER — SODIUM CHLORIDE 9 MG/ML
1000 INJECTION, SOLUTION INTRAVENOUS
Refills: 0 | Status: DISCONTINUED | OUTPATIENT
Start: 2021-03-20 | End: 2021-03-21

## 2021-03-20 RX ADMIN — SODIUM CHLORIDE 100 MILLILITER(S): 9 INJECTION, SOLUTION INTRAVENOUS at 22:31

## 2021-03-20 RX ADMIN — CEFEPIME 100 MILLIGRAM(S): 1 INJECTION, POWDER, FOR SOLUTION INTRAMUSCULAR; INTRAVENOUS at 11:30

## 2021-03-20 RX ADMIN — PANTOPRAZOLE SODIUM 40 MILLIGRAM(S): 20 TABLET, DELAYED RELEASE ORAL at 11:31

## 2021-03-20 RX ADMIN — CHLORHEXIDINE GLUCONATE 15 MILLILITER(S): 213 SOLUTION TOPICAL at 05:33

## 2021-03-20 RX ADMIN — HEPARIN SODIUM 5000 UNIT(S): 5000 INJECTION INTRAVENOUS; SUBCUTANEOUS at 22:27

## 2021-03-20 RX ADMIN — DEXMEDETOMIDINE HYDROCHLORIDE IN 0.9% SODIUM CHLORIDE 5.9 MICROGRAM(S)/KG/HR: 4 INJECTION INTRAVENOUS at 05:32

## 2021-03-20 RX ADMIN — DEXMEDETOMIDINE HYDROCHLORIDE IN 0.9% SODIUM CHLORIDE 5.9 MICROGRAM(S)/KG/HR: 4 INJECTION INTRAVENOUS at 16:32

## 2021-03-20 RX ADMIN — PROPOFOL 14.1 MICROGRAM(S)/KG/MIN: 10 INJECTION, EMULSION INTRAVENOUS at 05:31

## 2021-03-20 RX ADMIN — DEXMEDETOMIDINE HYDROCHLORIDE IN 0.9% SODIUM CHLORIDE 5.9 MICROGRAM(S)/KG/HR: 4 INJECTION INTRAVENOUS at 02:02

## 2021-03-20 RX ADMIN — CHLORHEXIDINE GLUCONATE 1 APPLICATION(S): 213 SOLUTION TOPICAL at 11:31

## 2021-03-20 RX ADMIN — NYSTATIN CREAM 1 APPLICATION(S): 100000 CREAM TOPICAL at 17:15

## 2021-03-20 RX ADMIN — DEXMEDETOMIDINE HYDROCHLORIDE IN 0.9% SODIUM CHLORIDE 5.9 MICROGRAM(S)/KG/HR: 4 INJECTION INTRAVENOUS at 11:58

## 2021-03-20 RX ADMIN — DEXMEDETOMIDINE HYDROCHLORIDE IN 0.9% SODIUM CHLORIDE 5.9 MICROGRAM(S)/KG/HR: 4 INJECTION INTRAVENOUS at 21:07

## 2021-03-20 RX ADMIN — DEXMEDETOMIDINE HYDROCHLORIDE IN 0.9% SODIUM CHLORIDE 5.9 MICROGRAM(S)/KG/HR: 4 INJECTION INTRAVENOUS at 18:08

## 2021-03-20 RX ADMIN — SODIUM CHLORIDE 1000 MILLILITER(S): 9 INJECTION INTRAMUSCULAR; INTRAVENOUS; SUBCUTANEOUS at 05:14

## 2021-03-20 RX ADMIN — SODIUM CHLORIDE 100 MILLILITER(S): 9 INJECTION, SOLUTION INTRAVENOUS at 12:39

## 2021-03-20 RX ADMIN — SODIUM CHLORIDE 1000 MILLILITER(S): 9 INJECTION INTRAMUSCULAR; INTRAVENOUS; SUBCUTANEOUS at 00:08

## 2021-03-20 RX ADMIN — HEPARIN SODIUM 5000 UNIT(S): 5000 INJECTION INTRAVENOUS; SUBCUTANEOUS at 14:53

## 2021-03-20 RX ADMIN — INSULIN HUMAN 10 UNIT(S)/HR: 100 INJECTION, SOLUTION SUBCUTANEOUS at 16:33

## 2021-03-20 RX ADMIN — SODIUM CHLORIDE 150 MILLILITER(S): 9 INJECTION, SOLUTION INTRAVENOUS at 05:33

## 2021-03-20 RX ADMIN — NYSTATIN CREAM 1 APPLICATION(S): 100000 CREAM TOPICAL at 06:06

## 2021-03-20 RX ADMIN — HEPARIN SODIUM 5000 UNIT(S): 5000 INJECTION INTRAVENOUS; SUBCUTANEOUS at 05:33

## 2021-03-20 NOTE — PROGRESS NOTE ADULT - SUBJECTIVE AND OBJECTIVE BOX
24 hr events:      ## ROS:  [ ] unable to obtain  CONSTITUTIONAL: No fever, weight loss, or fatigue  EYES: No eye pain, visual disturbances, or discharge  ENMT:  No difficulty hearing, tinnitus, vertigo; No sinus or throat pain  NECK: No pain or stiffness  RESPIRATORY: No cough, wheezing, chills or hemoptysis; No shortness of breath  CARDIOVASCULAR: No chest pain, palpitations, dizziness, or leg swelling  GASTROINTESTINAL: No abdominal or epigastric pain. No nausea, vomiting, or hematemesis; No diarrhea or constipation. No melena or hematochezia.  GENITOURINARY: No dysuria, frequency, hematuria, or incontinence  NEUROLOGICAL: No headaches, memory loss, loss of strength, numbness, or tremors  SKIN: No itching, burning, rashes, or lesions   LYMPH NODES: No enlarged glands  ENDOCRINE: No heat or cold intolerance; No hair loss  MUSCULOSKELETAL: No joint pain or swelling; No muscle, back, or extremity pain  PSYCHIATRIC: No depression, anxiety, mood swings, or difficulty sleeping  HEME/LYMPH: No easy bruising, or bleeding gums  ALLERGY AND IMMUNOLOGIC: No hives or eczema    ## Labs:  CBC:                        12.3   10.15 )-----------( 122      ( 20 Mar 2021 02:20 )             38.8     Chem:  03-20    155<H>  |  126<H>  |  92<H>  ----------------------------<  198<H>  4.8   |  24  |  4.48<H>    Ca    7.9<L>      20 Mar 2021 02:20  Phos  2.1     03-20  Mg     3.8     03-20    TPro  6.3  /  Alb  3.0<L>  /  TBili  0.5  /  DBili  x   /  AST  70<H>  /  ALT  30  /  AlkPhos  66  03-20    Coags:          ## Imaging:    ## Medications:  cefepime   IVPB      cefepime   IVPB 2000 milliGRAM(s) IV Intermittent every 24 hours    norepinephrine Infusion 0.05 MICROgram(s)/kG/Min IV Continuous <Continuous>      insulin regular Infusion 10 Unit(s)/Hr IV Continuous <Continuous>    heparin   Injectable 5000 Unit(s) SubCutaneous every 8 hours    pantoprazole  Injectable 40 milliGRAM(s) IV Push daily    dexMEDEtomidine Infusion 0.2 MICROgram(s)/kG/Hr IV Continuous <Continuous>  propofol Infusion 20 MICROgram(s)/kG/Min IV Continuous <Continuous>      ## Vitals:  T(C): 35.6 (03-20-21 @ 04:10), Max: 38.4 (03-19-21 @ 19:06)  HR: 61 (03-20-21 @ 08:30) (58 - 109)  BP: 109/50 (03-20-21 @ 08:30) (81/36 - 158/80)  BP(mean): 64 (03-20-21 @ 08:30) (46 - 109)  RR: 16 (03-20-21 @ 08:30) (14 - 25)  SpO2: 100% (03-20-21 @ 08:30) (92% - 100%)  Wt(kg): --  Vent: Mode: AC/ CMV (Assist Control/ Continuous Mandatory Ventilation), RR (machine): 14, RR (patient): 17, TV (machine): 480, FiO2: 80, PEEP: 8, PIP: 20  ABG: ABG - ( 19 Mar 2021 17:31 )  pH, Arterial: x     pH, Blood: 7.36  /  pCO2: 44    /  pO2: 181   / HCO3: 24    / Base Excess: -1.1  /  SaO2: 99                    03-19 @ 07:01  -  03-20 @ 07:00  --------------------------------------------------------  IN: 9638.7 mL / OUT: 2670 mL / NET: 6968.7 mL    03-20 @ 07:01  -  03-20 @ 09:10  --------------------------------------------------------  IN: 202.1 mL / OUT: 250 mL / NET: -47.9 mL          ## P/E:  Gen: lying comfortably in bed in no apparent distress  HEENT: PERRL, EOMI  Resp: CTA B/L no c/r/w  CVS: S1S2 no m/r/g  Abd: soft NT/ND +BS  Ext: no c/c/e  Neuro: A&Ox3    CENTRAL LINE: [ ] YES [ ] NO  LOCATION:   DATE INSERTED:  REMOVE: [ ] YES [ ] NO      VALDEZ: [ ] YES [ ] NO    DATE INSERTED:  REMOVE:  [ ] YES [ ] NO      A-LINE:  [ ] YES [ ] NO  LOCATION:   DATE INSERTED:  REMOVE:  [ ] YES [ ] NO  EXPLAIN:    GLOBAL ISSUE/BEST PRACTICE:  Analgesia:  Sedation:  HOB elevation: yes  Stress ulcer prophylaxis:  VTE prophylaxis:  Oral Care:  Glycemic control:  Nutrition:    CODE STATUS: [ ] full code  [ ] DNR  [ ] DNI  [ ] MOLST  Goals of care discussion: [ ] yes  24 hr events:  febrile  followed simple commands on sedation vacation  weaned and extubated this afternoon  post extubation pt answering simple questions such as name, age, however also restless, agitated - restarted back on precedex drip  weaned off levophed this morning at 2AM, only required vasopressor for a few hours  UO low overnight, s/p 5L IVF bolus and pt started to make urine output  remains on insulin drip for HHNK , glucose improved however with critical illness will keep on insulin drip for another 24 hrs pending mental status improvement for oral intake      ## ROS:  [x ] unable to obtain due to acute metabolic encephalopathy sedation      ## Labs:  CBC:                        12.3   10.15 )-----------( 122      ( 20 Mar 2021 02:20 )             38.8     Chem:  03-20    155<H>  |  126<H>  |  92<H>  ----------------------------<  198<H>  4.8          |  24          |  4.48<H>    Ca    7.9<L>      20 Mar 2021 02:20  Phos  2.1     03-20  Mg     3.8     03-20    TPro  6.3  /  Alb  3.0<L>  /  TBili  0.5  /  DBili  x   /  AST  70<H>  /  ALT  30  /  AlkPhos  66  03-20    Culture - Urine (03.19.21 @ 09:22)    Specimen Source: .Urine Clean Catch (Midstream)    Culture Results: <10,000 CFU/mL Normal Urogenital Zoila    Culture - Blood (03.19.21 @ 09:10)    Specimen Source: .Blood Blood-Peripheral    Culture Results: No growth to date.    Culture - Sputum . (03.20.21 @ 00:46)    Specimen Source: .Sputum Sputum    Culture Results: pending    MRSA/MSSA PCR (03.20.21 @ 01:59)    MRSA PCR Result.: NotDetec    Staph Aureus PCR Result: Detected        ## Imaging:  CT head < from: CT Head No Cont (03.19.21 @ 20:43) >  No acute intracranial hemorrhage, mass effect, or shift of the midline structure    CXR < from: Xray Chest 1 View-PORTABLE IMMEDIATE (Xray Chest 1 View-PORTABLE IMMEDIATE .) (03.19.21 @ 17:24) >  No evidence of active chest disease.        ## Medications:  cefepime   IVPB 2000 milliGRAM(s) IV Intermittent every 24 hours    norepinephrine Infusion 0.05 MICROgram(s)/kG/Min IV Continuous <Continuous>      insulin regular Infusion 10 Unit(s)/Hr IV Continuous <Continuous>    heparin   Injectable 5000 Unit(s) SubCutaneous every 8 hours    pantoprazole  Injectable 40 milliGRAM(s) IV Push daily    dexMEDEtomidine Infusion 0.2 MICROgram(s)/kG/Hr IV Continuous <Continuous>        ## Vitals:  T(C): 35.6 (03-20-21 @ 04:10), Max: 38.4 (03-19-21 @ 19:06)  HR: 61 (03-20-21 @ 08:30) (58 - 109)  BP: 109/50 (03-20-21 @ 08:30) (81/36 - 158/80)  BP(mean): 64 (03-20-21 @ 08:30) (46 - 109)  RR: 16 (03-20-21 @ 08:30) (14 - 25)  SpO2: 100% (03-20-21 @ 08:30) (92% - 100%)      ABG: ABG - ( 19 Mar 2021 17:31 )  pH, Arterial: x     pH, Blood: 7.36  /  pCO2: 44    /  pO2: 181   / HCO3: 24    / Base Excess: -1.1  /  SaO2: 99            03-19 @ 07:01  -  03-20 @ 07:00  --------------------------------------------------------  IN: 9638.7 mL / OUT: 2670 mL / NET: 6968.7 mL    03-20 @ 07:01  -  03-20 @ 09:10  --------------------------------------------------------  IN: 202.1 mL / OUT: 250 mL / NET: -47.9 mL          ## P/E:  Gen: young male, lying comfortably in bed sedated on precedex  HEENT: PERRL, sclera white, dry lips  Resp: CTA B/L   CVS: RRR  Abd: soft NT/ND +BS  Ext: no c/c/e  Neuro: able to answer very simple questions: name, age, follows commands (squeeze hand, move toes) however still encephalopathic, noted to be smiling, clenching down with bilateral facial cheek twitching (but pt responsive during episodes)     CENTRAL LINE: [ ] YES [x ] NO  LOCATION:   DATE INSERTED:  REMOVE: [ ] YES [ ] NO      VALDEZ: [ x] YES [ ] NO    DATE INSERTED:  REMOVE:  [x ] YES [ ] NO      A-LINE:  [ ] YES [x ] NO  LOCATION:   DATE INSERTED:  REMOVE:  [ ] YES [ ] NO  EXPLAIN:    GLOBAL ISSUE/BEST PRACTICE:  Analgesia: n/a  Sedation: precedex  HOB elevation: yes  Stress ulcer prophylaxis: n/a  VTE prophylaxis: yes  Oral Care: n/a  Glycemic control: yes, insulin drip  Nutrition: npo    CODE STATUS: [x ] full code  [ ] DNR  [ ] DNI  [ ] MOLST  Goals of care discussion: [ ] yes  24 hr events:  febrile  followed simple commands on sedation vacation  weaned and extubated this afternoon  post extubation pt answering simple questions such as name, age, however also restless, agitated - restarted back on precedex drip  weaned off levophed this morning at 2AM, only required vasopressor for a few hours  UO low overnight, s/p 5L IVF bolus and pt started to make urine output  remains on insulin drip for DKA, glucose improved however with critical illness will keep on insulin drip for another 24 hrs pending mental status improvement for oral intake      ## ROS:  [x ] unable to obtain due to acute metabolic encephalopathy sedation      ## Labs:  CBC:                        12.3   10.15 )-----------( 122      ( 20 Mar 2021 02:20 )             38.8     Chem:  03-20    155<H>  |  126<H>  |  92<H>  ----------------------------<  198<H>  4.8          |  24          |  4.48<H>    Ca    7.9<L>      20 Mar 2021 02:20  Phos  2.1     03-20  Mg     3.8     03-20    TPro  6.3  /  Alb  3.0<L>  /  TBili  0.5  /  DBili  x   /  AST  70<H>  /  ALT  30  /  AlkPhos  66  03-20    Culture - Urine (03.19.21 @ 09:22)    Specimen Source: .Urine Clean Catch (Midstream)    Culture Results: <10,000 CFU/mL Normal Urogenital Zoila    Culture - Blood (03.19.21 @ 09:10)    Specimen Source: .Blood Blood-Peripheral    Culture Results: No growth to date.    Culture - Sputum . (03.20.21 @ 00:46)    Specimen Source: .Sputum Sputum    Culture Results: pending    MRSA/MSSA PCR (03.20.21 @ 01:59)    MRSA PCR Result.: NotDetec    Staph Aureus PCR Result: Detected        ## Imaging:  CT head < from: CT Head No Cont (03.19.21 @ 20:43) >  No acute intracranial hemorrhage, mass effect, or shift of the midline structure    CXR < from: Xray Chest 1 View-PORTABLE IMMEDIATE (Xray Chest 1 View-PORTABLE IMMEDIATE .) (03.19.21 @ 17:24) >  No evidence of active chest disease.        ## Medications:  cefepime   IVPB 2000 milliGRAM(s) IV Intermittent every 24 hours    norepinephrine Infusion 0.05 MICROgram(s)/kG/Min IV Continuous <Continuous>      insulin regular Infusion 10 Unit(s)/Hr IV Continuous <Continuous>    heparin   Injectable 5000 Unit(s) SubCutaneous every 8 hours    pantoprazole  Injectable 40 milliGRAM(s) IV Push daily    dexMEDEtomidine Infusion 0.2 MICROgram(s)/kG/Hr IV Continuous <Continuous>        ## Vitals:  T(C): 35.6 (03-20-21 @ 04:10), Max: 38.4 (03-19-21 @ 19:06)  HR: 61 (03-20-21 @ 08:30) (58 - 109)  BP: 109/50 (03-20-21 @ 08:30) (81/36 - 158/80)  BP(mean): 64 (03-20-21 @ 08:30) (46 - 109)  RR: 16 (03-20-21 @ 08:30) (14 - 25)  SpO2: 100% (03-20-21 @ 08:30) (92% - 100%)      ABG: ABG - ( 19 Mar 2021 17:31 )  pH, Arterial: x     pH, Blood: 7.36  /  pCO2: 44    /  pO2: 181   / HCO3: 24    / Base Excess: -1.1  /  SaO2: 99            03-19 @ 07:01  -  03-20 @ 07:00  --------------------------------------------------------  IN: 9638.7 mL / OUT: 2670 mL / NET: 6968.7 mL    03-20 @ 07:01  -  03-20 @ 09:10  --------------------------------------------------------  IN: 202.1 mL / OUT: 250 mL / NET: -47.9 mL          ## P/E:  Gen: young male, lying comfortably in bed sedated on precedex  HEENT: PERRL, sclera white, dry lips  Resp: CTA B/L   CVS: RRR  Abd: soft NT/ND +BS  Ext: no c/c/e  Neuro: able to answer very simple questions: name, age, follows commands (squeeze hand, move toes) however still encephalopathic, noted to be smiling, clenching down with bilateral facial cheek twitching (but pt responsive during episodes)     CENTRAL LINE: [ ] YES [x ] NO  LOCATION:   DATE INSERTED:  REMOVE: [ ] YES [ ] NO      VALDEZ: [ x] YES [ ] NO    DATE INSERTED:  REMOVE:  [x ] YES [ ] NO      A-LINE:  [ ] YES [x ] NO  LOCATION:   DATE INSERTED:  REMOVE:  [ ] YES [ ] NO  EXPLAIN:    GLOBAL ISSUE/BEST PRACTICE:  Analgesia: n/a  Sedation: precedex  HOB elevation: yes  Stress ulcer prophylaxis: n/a  VTE prophylaxis: yes  Oral Care: n/a  Glycemic control: yes, insulin drip  Nutrition: npo    CODE STATUS: [x ] full code  [ ] DNR  [ ] DNI  [ ] MOLST  Goals of care discussion: [ ] yes

## 2021-03-20 NOTE — PROGRESS NOTE ADULT - SUBJECTIVE AND OBJECTIVE BOX
[facility] NEPHROLOGY SERVICES, [facility]  NEPHROLOGY AND HYPERTENSION  [address]  [address]  [place] [address]  [phone]    MD XOCHILT LOPEZ, MD HARRIET POOLE, MD NANCI BARBOUR, MD ELY PAREDES, MD LIZZETH CRUZ MD          Patient events noted  extubated, confused       MEDICATIONS  (STANDING):  cefepime   IVPB 2000 milliGRAM(s) IV Intermittent every 24 hours  cefepime   IVPB      chlorhexidine 2% Cloths 1 Application(s) Topical daily  dexMEDEtomidine Infusion 0.2 MICROgram(s)/kG/Hr (5.9 mL/Hr) IV Continuous <Continuous>  dextrose 5% + sodium chloride 0.45%. 1000 milliLiter(s) (100 mL/Hr) IV Continuous <Continuous>  heparin   Injectable 5000 Unit(s) SubCutaneous every 8 hours  insulin regular Infusion 10 Unit(s)/Hr (10 mL/Hr) IV Continuous <Continuous>  nystatin Cream 1 Application(s) Topical two times a day  pantoprazole  Injectable 40 milliGRAM(s) IV Push daily    MEDICATIONS  (PRN):      03-19-21 @ 07:01  -  03-20-21 @ 07:00  --------------------------------------------------------  IN: 9638.7 mL / OUT: 2670 mL / NET: 6968.7 mL    03-20-21 @ 07:01  -  03-20-21 @ 19:18  --------------------------------------------------------  IN: 1760 mL / OUT: 2925 mL / NET: -1165 mL      PHYSICAL EXAM:      T(C): 38.2 (03-20-21 @ 15:00), Max: 38.3 (03-19-21 @ 21:30)  HR: 85 (03-20-21 @ 19:00) (56 - 105)  BP: 128/67 (03-20-21 @ 19:00) (81/36 - 168/114)  RR: 18 (03-20-21 @ 19:00) (14 - 25)  SpO2: 96% (03-20-21 @ 19:00) (95% - 100%)  Wt(kg): --  Lungs clear  Heart S1S2  Abd soft NT ND  Extremities:   tr edema                                    12.3   10.15 )-----------( 122      ( 20 Mar 2021 02:20 )             38.8     03-20    154<H>  |  127<H>  |  79<H>  ----------------------------<  286<H>  4.9   |  23  |  3.11<H>    Ca    7.8<L>      20 Mar 2021 11:24  Phos  2.1     03-20  Mg     3.8     03-20    TPro  6.0  /  Alb  2.8<L>  /  TBili  0.3  /  DBili  x   /  AST  99<H>  /  ALT  39  /  AlkPhos  69  03-20    ABG - ( 20 Mar 2021 10:57 )  pH, Arterial: x     pH, Blood: 7.35  /  pCO2: 38    /  pO2: 135   / HCO3: 21    / Base Excess: -4.0  /  SaO2: 99                LIVER FUNCTIONS - ( 20 Mar 2021 11:24 )  Alb: 2.8 g/dL / Pro: 6.0 gm/dL / ALK PHOS: 69 U/L / ALT: 39 U/L / AST: 99 U/L / GGT: x           Creatinine Trend: 3.11<--, 4.48<--, 5.00<--, 4.33<--, 3.87<--, 4.86<--  Mode: AC/ CMV (Assist Control/ Continuous Mandatory Ventilation)  RR (machine): 14  TV (machine): 480  FiO2: 40  PEEP: 8  ITime: 0.1  MAP: 10  PIP: 16            Assessment   ASHLEE, DKA, hypernatremia; pre renal azotemia; acute HTN injury;   R/O occult secondary GN, CTD/ vasculitic related, pulm renal disease  Slowly improving     Plan  Hypotonic IVF  UA micro  CPK trend  Serologies pending  Renal sono once stable    Eddy Bello MD

## 2021-03-20 NOTE — PROGRESS NOTE ADULT - PROBLEM SELECTOR PLAN 1
currently stable with Diabetic Ketoacidosis resolved   Continue with the same regimen while inpatient   slow transition to sc insulin   Encourage more nutrition

## 2021-03-20 NOTE — PROGRESS NOTE ADULT - SUBJECTIVE AND OBJECTIVE BOX
Patient is a 25y old  Male who presents with a chief complaint of     Interval History: continued on IV insulin   Anion Gap 4 and HCO3 is 23 , other electrolytes normal but Creatinine still elevated   finger sticks in high 200's     MEDICATIONS  (STANDING):  cefepime   IVPB      cefepime   IVPB 2000 milliGRAM(s) IV Intermittent every 24 hours  chlorhexidine 2% Cloths 1 Application(s) Topical daily  dexMEDEtomidine Infusion 0.2 MICROgram(s)/kG/Hr (5.9 mL/Hr) IV Continuous <Continuous>  dextrose 5% + sodium chloride 0.45%. 1000 milliLiter(s) (100 mL/Hr) IV Continuous <Continuous>  heparin   Injectable 5000 Unit(s) SubCutaneous every 8 hours  insulin regular Infusion 10 Unit(s)/Hr (10 mL/Hr) IV Continuous <Continuous>  nystatin Cream 1 Application(s) Topical two times a day  pantoprazole  Injectable 40 milliGRAM(s) IV Push daily    MEDICATIONS  (PRN):      Allergies    No Known Allergies    Intolerances        REVIEW OF SYSTEMS:  CONSTITUTIONAL: no changes    Vital Signs Last 24 Hrs  T(C): 38.2 (20 Mar 2021 15:00), Max: 38.4 (19 Mar 2021 19:06)  T(F): 100.8 (20 Mar 2021 15:00), Max: 101.1 (19 Mar 2021 19:06)  HR: 102 (20 Mar 2021 15:00) (56 - 105)  BP: 153/90 (20 Mar 2021 15:00) (81/36 - 168/114)  BP(mean): 100 (20 Mar 2021 15:00) (46 - 126)  RR: 18 (20 Mar 2021 15:00) (14 - 25)  SpO2: 100% (20 Mar 2021 15:00) (92% - 100%)    PHYSICAL EXAM:  GENERAL:   HEAD: Atraumatic, Normocephalic  EYES: PERRLA, conjunctiva and sclera clear  CHEST/LUNG: Clear to auscultaion bilaterally; No rales, rhonchi, wheezing, or rubs  HEART: Regular rate and rhythm; No murmurs, rubs, or gallops  ABDOMEN: Soft, Nontender, Nondistended; Bowel sounds present  EXTREMITIES:  2+ Peripheral Pulses, no edema  LABS:      Urinalysis Basic - ( 19 Mar 2021 22:18 )    Color: Yellow / Appearance: very cloudy / S.025 / pH: x  Gluc: x / Ketone: Small  / Bili: Negative / Urobili: Negative mg/dL   Blood: x / Protein: 100 mg/dL / Nitrite: Negative   Leuk Esterase: Moderate / RBC: 3-5 /HPF / WBC 11-25   Sq Epi: x / Non Sq Epi: Occasional / Bacteria: Many      CAPILLARY BLOOD GLUCOSE      POCT Blood Glucose.: 362 mg/dL (20 Mar 2021 15:52)  POCT Blood Glucose.: 297 mg/dL (20 Mar 2021 14:56)  POCT Blood Glucose.: 240 mg/dL (20 Mar 2021 13:55)  POCT Blood Glucose.: 244 mg/dL (20 Mar 2021 12:47)  POCT Blood Glucose.: 230 mg/dL (20 Mar 2021 11:46)  POCT Blood Glucose.: 271 mg/dL (20 Mar 2021 10:53)  POCT Blood Glucose.: 247 mg/dL (20 Mar 2021 09:42)  POCT Blood Glucose.: 284 mg/dL (20 Mar 2021 08:41)  POCT Blood Glucose.: 275 mg/dL (20 Mar 2021 07:38)  POCT Blood Glucose.: 290 mg/dL (20 Mar 2021 06:36)  POCT Blood Glucose.: 244 mg/dL (20 Mar 2021 05:42)  POCT Blood Glucose.: 231 mg/dL (20 Mar 2021 05:00)  POCT Blood Glucose.: 171 mg/dL (20 Mar 2021 03:39)  POCT Blood Glucose.: 194 mg/dL (20 Mar 2021 02:45)  POCT Blood Glucose.: 181 mg/dL (20 Mar 2021 01:46)  POCT Blood Glucose.: 218 mg/dL (20 Mar 2021 00:33)  POCT Blood Glucose.: 175 mg/dL (19 Mar 2021 23:34)  POCT Blood Glucose.: 145 mg/dL (19 Mar 2021 22:33)  POCT Blood Glucose.: 195 mg/dL (19 Mar 2021 21:20)  POCT Blood Glucose.: 252 mg/dL (19 Mar 2021 19:51)  POCT Blood Glucose.: 191 mg/dL (19 Mar 2021 18:51)  POCT Blood Glucose.: 263 mg/dL (19 Mar 2021 18:04)  POCT Blood Glucose.: 224 mg/dL (19 Mar 2021 17:47)  POCT Blood Glucose.: 224 mg/dL (19 Mar 2021 16:59)    Lipid panel:   CARDIAC MARKERS ( 19 Mar 2021 22:18 )  x     / x     / 3595 U/L / x     / x          ABG - ( 20 Mar 2021 10:57 )  pH, Arterial: x     pH, Blood: 7.35  /  pCO2: 38    /  pO2: 135   / HCO3: 21    / Base Excess: -4.0  /  SaO2: 99                Mode: AC/ CMV (Assist Control/ Continuous Mandatory Ventilation)  RR (machine): 14  TV (machine): 480  FiO2: 40  PEEP: 8  ITime: 0.1  MAP: 10  PIP: 16    Thyroid:- @ 22:18 TSH 0.391 <<Free T4>> -- <<T3>> -- <<TG Ab>> -- <<ATPOAB>> -- <<TBG>> -- <<TSI>> -- Reverse T3 --    Diabetes Tests:  Parathyroid Panel: @ 03:44 Calcium -- Intact  PTH-RP --    Adrenals:  RADIOLOGY & ADDITIONAL TESTS:    Imaging Personally Reviewed:  [ ] YES  [ ] NO    Consultant(s) Notes Reviewed:  [ ] YES  [ ] NO    Care Discussed with Consultants/Other Providers [ ] YES  [ ] NO

## 2021-03-20 NOTE — PROGRESS NOTE ADULT - ASSESSMENT
25M no known PMH presents for lethargy, AMS, agitation. Found to have a blood sugar level of 1374. Cr 5.2 and , anion gap 29 on admission. COVID PCR negative however with + COVID Ab. Admitted for DKA, severe dehydration, acute renal insufficiency, uremia, acute metabolic encephalopathy, sepsis with septic shock. Course complicated by acute hypoxic respiratory failure requiring intubation 3/19. Extubated today 3/20. Febrile.    - cont sedation with precedex drip for underlying agitation, slowly wean off sedation as tolerates  - unclear reason for encephalopathy: infectious vs metabolic  - CT head unrevealing  - U tox negative  - wean off levophed, BP at present stable off pressors, will cont to monitor BP  - DKA resolved with insulin drip and IVF  - however remains on insulin drip for better glycemic control, blood sugar in the 200s, NPO at present time due to sedation for underlying agitation   - cont background IVF D5 1/2NS while insulin drip infusing as pt NPO  - cont cefepime for underlying sepsis  - follow up sputum cx results  - Cr improving, urinating, monitor output and renal function  - weaned and extubated early this afternoon, doing well from respiratory standpoint, doing well on RA    Critical Care Time: 60 min   25M no known PMH presents for lethargy, AMS, agitation. Found to have a blood sugar level of 1374. Cr 5.2 and , anion gap 29 on admission. COVID PCR negative however with + COVID Ab. Admitted for DKA, severe dehydration, acute renal insufficiency, uremia, acute metabolic encephalopathy, sepsis with septic shock. Course complicated by acute hypoxic respiratory failure requiring intubation 3/19. Extubated today 3/20. Febrile. Hypernatremic.    - cont sedation with precedex drip for underlying agitation, slowly wean off sedation as tolerates  - unclear reason for encephalopathy: infectious vs metabolic  - CT head unrevealing  - U tox negative  - wean off levophed, BP at present stable off pressors, will cont to monitor BP  - DKA resolved with insulin drip and IVF  - however remains on insulin drip for better glycemic control, blood sugar in the 200s, NPO at present time due to sedation for underlying agitation   - cont background IVF D5 1/2NS while insulin drip infusing as pt NPO, Also to treat hypernatremia as pt sedated and unable to encourage free water intake by mouth  - cont cefepime for underlying sepsis  - follow up sputum cx results  - Cr improving, urinating, monitor output and renal function  - weaned and extubated early this afternoon, doing well from respiratory standpoint, on RA    Critical Care Time: 60 min

## 2021-03-21 LAB
-  AMPICILLIN/SULBACTAM: SIGNIFICANT CHANGE UP
-  CEFAZOLIN: SIGNIFICANT CHANGE UP
-  CLINDAMYCIN: SIGNIFICANT CHANGE UP
-  ERYTHROMYCIN: SIGNIFICANT CHANGE UP
-  GENTAMICIN: SIGNIFICANT CHANGE UP
-  OXACILLIN: SIGNIFICANT CHANGE UP
-  RIFAMPIN: SIGNIFICANT CHANGE UP
-  TETRACYCLINE: SIGNIFICANT CHANGE UP
-  TRIMETHOPRIM/SULFAMETHOXAZOLE: SIGNIFICANT CHANGE UP
-  VANCOMYCIN: SIGNIFICANT CHANGE UP
ALBUMIN SERPL ELPH-MCNC: 3 G/DL — LOW (ref 3.3–5)
ALP SERPL-CCNC: 76 U/L — SIGNIFICANT CHANGE UP (ref 40–120)
ALT FLD-CCNC: 56 U/L — SIGNIFICANT CHANGE UP (ref 12–78)
ANION GAP SERPL CALC-SCNC: 5 MMOL/L — SIGNIFICANT CHANGE UP (ref 5–17)
ANION GAP SERPL CALC-SCNC: 5 MMOL/L — SIGNIFICANT CHANGE UP (ref 5–17)
AST SERPL-CCNC: 146 U/L — HIGH (ref 15–37)
AUTO DIFF PNL BLD: NEGATIVE — SIGNIFICANT CHANGE UP
BASOPHILS # BLD AUTO: 0.02 K/UL — SIGNIFICANT CHANGE UP (ref 0–0.2)
BASOPHILS NFR BLD AUTO: 0.2 % — SIGNIFICANT CHANGE UP (ref 0–2)
BILIRUB SERPL-MCNC: 0.6 MG/DL — SIGNIFICANT CHANGE UP (ref 0.2–1.2)
BUN SERPL-MCNC: 23 MG/DL — SIGNIFICANT CHANGE UP (ref 7–23)
BUN SERPL-MCNC: 39 MG/DL — HIGH (ref 7–23)
C-ANCA SER-ACNC: NEGATIVE — SIGNIFICANT CHANGE UP
CALCIUM SERPL-MCNC: 8.9 MG/DL — SIGNIFICANT CHANGE UP (ref 8.5–10.1)
CALCIUM SERPL-MCNC: 9.1 MG/DL — SIGNIFICANT CHANGE UP (ref 8.5–10.1)
CHLORIDE SERPL-SCNC: 125 MMOL/L — HIGH (ref 96–108)
CHLORIDE SERPL-SCNC: 128 MMOL/L — HIGH (ref 96–108)
CK SERPL-CCNC: 9245 U/L — HIGH (ref 26–308)
CO2 SERPL-SCNC: 21 MMOL/L — LOW (ref 22–31)
CO2 SERPL-SCNC: 26 MMOL/L — SIGNIFICANT CHANGE UP (ref 22–31)
CREAT SERPL-MCNC: 1.53 MG/DL — HIGH (ref 0.5–1.3)
CREAT SERPL-MCNC: 1.89 MG/DL — HIGH (ref 0.5–1.3)
CULTURE RESULTS: SIGNIFICANT CHANGE UP
EOSINOPHIL # BLD AUTO: 0.05 K/UL — SIGNIFICANT CHANGE UP (ref 0–0.5)
EOSINOPHIL NFR BLD AUTO: 0.5 % — SIGNIFICANT CHANGE UP (ref 0–6)
GLUCOSE BLDC GLUCOMTR-MCNC: 263 MG/DL — HIGH (ref 70–99)
GLUCOSE BLDC GLUCOMTR-MCNC: 264 MG/DL — HIGH (ref 70–99)
GLUCOSE BLDC GLUCOMTR-MCNC: 265 MG/DL — HIGH (ref 70–99)
GLUCOSE BLDC GLUCOMTR-MCNC: 287 MG/DL — HIGH (ref 70–99)
GLUCOSE BLDC GLUCOMTR-MCNC: 299 MG/DL — HIGH (ref 70–99)
GLUCOSE BLDC GLUCOMTR-MCNC: 310 MG/DL — HIGH (ref 70–99)
GLUCOSE BLDC GLUCOMTR-MCNC: 313 MG/DL — HIGH (ref 70–99)
GLUCOSE BLDC GLUCOMTR-MCNC: 316 MG/DL — HIGH (ref 70–99)
GLUCOSE BLDC GLUCOMTR-MCNC: 323 MG/DL — HIGH (ref 70–99)
GLUCOSE BLDC GLUCOMTR-MCNC: 333 MG/DL — HIGH (ref 70–99)
GLUCOSE BLDC GLUCOMTR-MCNC: 333 MG/DL — HIGH (ref 70–99)
GLUCOSE BLDC GLUCOMTR-MCNC: 335 MG/DL — HIGH (ref 70–99)
GLUCOSE BLDC GLUCOMTR-MCNC: 339 MG/DL — HIGH (ref 70–99)
GLUCOSE BLDC GLUCOMTR-MCNC: 346 MG/DL — HIGH (ref 70–99)
GLUCOSE BLDC GLUCOMTR-MCNC: 353 MG/DL — HIGH (ref 70–99)
GLUCOSE BLDC GLUCOMTR-MCNC: 353 MG/DL — HIGH (ref 70–99)
GLUCOSE BLDC GLUCOMTR-MCNC: 373 MG/DL — HIGH (ref 70–99)
GLUCOSE BLDC GLUCOMTR-MCNC: 401 MG/DL — HIGH (ref 70–99)
GLUCOSE BLDC GLUCOMTR-MCNC: 497 MG/DL — CRITICAL HIGH (ref 70–99)
GLUCOSE BLDC GLUCOMTR-MCNC: 500 MG/DL — CRITICAL HIGH (ref 70–99)
GLUCOSE BLDC GLUCOMTR-MCNC: 583 MG/DL — CRITICAL HIGH (ref 70–99)
GLUCOSE SERPL-MCNC: 241 MG/DL — HIGH (ref 70–99)
GLUCOSE SERPL-MCNC: 379 MG/DL — HIGH (ref 70–99)
GRAM STN FLD: SIGNIFICANT CHANGE UP
HCT VFR BLD CALC: 42.6 % — SIGNIFICANT CHANGE UP (ref 39–50)
HGB BLD-MCNC: 13 G/DL — SIGNIFICANT CHANGE UP (ref 13–17)
IMM GRANULOCYTES NFR BLD AUTO: 0.6 % — SIGNIFICANT CHANGE UP (ref 0–1.5)
LYMPHOCYTES # BLD AUTO: 1.45 K/UL — SIGNIFICANT CHANGE UP (ref 1–3.3)
LYMPHOCYTES # BLD AUTO: 14.9 % — SIGNIFICANT CHANGE UP (ref 13–44)
MAGNESIUM SERPL-MCNC: 2.9 MG/DL — HIGH (ref 1.6–2.6)
MAGNESIUM SERPL-MCNC: 3.6 MG/DL — HIGH (ref 1.6–2.6)
MCHC RBC-ENTMCNC: 26.5 PG — LOW (ref 27–34)
MCHC RBC-ENTMCNC: 30.5 GM/DL — LOW (ref 32–36)
MCV RBC AUTO: 86.8 FL — SIGNIFICANT CHANGE UP (ref 80–100)
METHOD TYPE: SIGNIFICANT CHANGE UP
MONOCYTES # BLD AUTO: 0.87 K/UL — SIGNIFICANT CHANGE UP (ref 0–0.9)
MONOCYTES NFR BLD AUTO: 8.9 % — SIGNIFICANT CHANGE UP (ref 2–14)
NEUTROPHILS # BLD AUTO: 7.3 K/UL — SIGNIFICANT CHANGE UP (ref 1.8–7.4)
NEUTROPHILS NFR BLD AUTO: 74.9 % — SIGNIFICANT CHANGE UP (ref 43–77)
NRBC # BLD: 0 /100 WBCS — SIGNIFICANT CHANGE UP (ref 0–0)
ORGANISM # SPEC MICROSCOPIC CNT: SIGNIFICANT CHANGE UP
ORGANISM # SPEC MICROSCOPIC CNT: SIGNIFICANT CHANGE UP
P-ANCA SER-ACNC: NEGATIVE — SIGNIFICANT CHANGE UP
PHOSPHATE SERPL-MCNC: 1.8 MG/DL — LOW (ref 2.5–4.5)
PHOSPHATE SERPL-MCNC: 1.9 MG/DL — LOW (ref 2.5–4.5)
PLATELET # BLD AUTO: 102 K/UL — LOW (ref 150–400)
POTASSIUM SERPL-MCNC: 4.3 MMOL/L — SIGNIFICANT CHANGE UP (ref 3.5–5.3)
POTASSIUM SERPL-MCNC: 5.4 MMOL/L — HIGH (ref 3.5–5.3)
POTASSIUM SERPL-SCNC: 4.3 MMOL/L — SIGNIFICANT CHANGE UP (ref 3.5–5.3)
POTASSIUM SERPL-SCNC: 5.4 MMOL/L — HIGH (ref 3.5–5.3)
PROT SERPL-MCNC: 7.1 GM/DL — SIGNIFICANT CHANGE UP (ref 6–8.3)
RBC # BLD: 4.91 M/UL — SIGNIFICANT CHANGE UP (ref 4.2–5.8)
RBC # FLD: 14.3 % — SIGNIFICANT CHANGE UP (ref 10.3–14.5)
SODIUM SERPL-SCNC: 151 MMOL/L — HIGH (ref 135–145)
SODIUM SERPL-SCNC: 159 MMOL/L — HIGH (ref 135–145)
SPECIMEN SOURCE: SIGNIFICANT CHANGE UP
WBC # BLD: 9.75 K/UL — SIGNIFICANT CHANGE UP (ref 3.8–10.5)
WBC # FLD AUTO: 9.75 K/UL — SIGNIFICANT CHANGE UP (ref 3.8–10.5)

## 2021-03-21 PROCEDURE — 93306 TTE W/DOPPLER COMPLETE: CPT | Mod: 26

## 2021-03-21 PROCEDURE — 99291 CRITICAL CARE FIRST HOUR: CPT

## 2021-03-21 RX ORDER — POTASSIUM PHOSPHATE, MONOBASIC POTASSIUM PHOSPHATE, DIBASIC 236; 224 MG/ML; MG/ML
15 INJECTION, SOLUTION INTRAVENOUS ONCE
Refills: 0 | Status: COMPLETED | OUTPATIENT
Start: 2021-03-21 | End: 2021-03-21

## 2021-03-21 RX ORDER — SODIUM CHLORIDE 9 MG/ML
1000 INJECTION, SOLUTION INTRAVENOUS
Refills: 0 | Status: DISCONTINUED | OUTPATIENT
Start: 2021-03-21 | End: 2021-03-22

## 2021-03-21 RX ORDER — ACETAMINOPHEN 500 MG
650 TABLET ORAL EVERY 6 HOURS
Refills: 0 | Status: DISCONTINUED | OUTPATIENT
Start: 2021-03-21 | End: 2021-03-26

## 2021-03-21 RX ORDER — ACETAMINOPHEN 500 MG
1000 TABLET ORAL ONCE
Refills: 0 | Status: COMPLETED | OUTPATIENT
Start: 2021-03-21 | End: 2021-03-21

## 2021-03-21 RX ORDER — FUROSEMIDE 40 MG
20 TABLET ORAL ONCE
Refills: 0 | Status: COMPLETED | OUTPATIENT
Start: 2021-03-21 | End: 2021-03-21

## 2021-03-21 RX ORDER — SODIUM CHLORIDE 9 MG/ML
1000 INJECTION, SOLUTION INTRAVENOUS
Refills: 0 | Status: DISCONTINUED | OUTPATIENT
Start: 2021-03-21 | End: 2021-03-21

## 2021-03-21 RX ADMIN — DEXMEDETOMIDINE HYDROCHLORIDE IN 0.9% SODIUM CHLORIDE 5.9 MICROGRAM(S)/KG/HR: 4 INJECTION INTRAVENOUS at 00:10

## 2021-03-21 RX ADMIN — SODIUM CHLORIDE 75 MILLILITER(S): 9 INJECTION, SOLUTION INTRAVENOUS at 23:19

## 2021-03-21 RX ADMIN — NYSTATIN CREAM 1 APPLICATION(S): 100000 CREAM TOPICAL at 18:16

## 2021-03-21 RX ADMIN — CEFEPIME 100 MILLIGRAM(S): 1 INJECTION, POWDER, FOR SOLUTION INTRAMUSCULAR; INTRAVENOUS at 13:20

## 2021-03-21 RX ADMIN — SODIUM CHLORIDE 125 MILLILITER(S): 9 INJECTION, SOLUTION INTRAVENOUS at 13:20

## 2021-03-21 RX ADMIN — HEPARIN SODIUM 5000 UNIT(S): 5000 INJECTION INTRAVENOUS; SUBCUTANEOUS at 22:27

## 2021-03-21 RX ADMIN — Medication 20 MILLIGRAM(S): at 23:31

## 2021-03-21 RX ADMIN — DEXMEDETOMIDINE HYDROCHLORIDE IN 0.9% SODIUM CHLORIDE 5.9 MICROGRAM(S)/KG/HR: 4 INJECTION INTRAVENOUS at 05:44

## 2021-03-21 RX ADMIN — Medication 1000 MILLIGRAM(S): at 14:20

## 2021-03-21 RX ADMIN — Medication 400 MILLIGRAM(S): at 13:21

## 2021-03-21 RX ADMIN — PANTOPRAZOLE SODIUM 40 MILLIGRAM(S): 20 TABLET, DELAYED RELEASE ORAL at 13:20

## 2021-03-21 RX ADMIN — POTASSIUM PHOSPHATE, MONOBASIC POTASSIUM PHOSPHATE, DIBASIC 62.5 MILLIMOLE(S): 236; 224 INJECTION, SOLUTION INTRAVENOUS at 13:51

## 2021-03-21 RX ADMIN — NYSTATIN CREAM 1 APPLICATION(S): 100000 CREAM TOPICAL at 07:33

## 2021-03-21 RX ADMIN — CHLORHEXIDINE GLUCONATE 1 APPLICATION(S): 213 SOLUTION TOPICAL at 16:00

## 2021-03-21 RX ADMIN — DEXMEDETOMIDINE HYDROCHLORIDE IN 0.9% SODIUM CHLORIDE 5.9 MICROGRAM(S)/KG/HR: 4 INJECTION INTRAVENOUS at 04:16

## 2021-03-21 RX ADMIN — HEPARIN SODIUM 5000 UNIT(S): 5000 INJECTION INTRAVENOUS; SUBCUTANEOUS at 13:21

## 2021-03-21 RX ADMIN — INSULIN HUMAN 10 UNIT(S)/HR: 100 INJECTION, SOLUTION SUBCUTANEOUS at 22:53

## 2021-03-21 RX ADMIN — HEPARIN SODIUM 5000 UNIT(S): 5000 INJECTION INTRAVENOUS; SUBCUTANEOUS at 07:33

## 2021-03-21 NOTE — PROGRESS NOTE ADULT - SUBJECTIVE AND OBJECTIVE BOX
[facility] NEPHROLOGY SERVICES, [facility]  NEPHROLOGY AND HYPERTENSION  [address]  [address]  [place] [address]  [phone]    MD XOCHILT LOPEZ, MD ARIANNA CONNER, MD HARRIET LUCAS, MD NANCI BARBOUR, MD ELY PAREDES, MD LIZZETH CRUZ MD          Patient events noted    MEDICATIONS  (STANDING):  cefepime   IVPB      cefepime   IVPB 2000 milliGRAM(s) IV Intermittent every 24 hours  chlorhexidine 2% Cloths 1 Application(s) Topical daily  dexMEDEtomidine Infusion 0.2 MICROgram(s)/kG/Hr (5.9 mL/Hr) IV Continuous <Continuous>  dextrose 5%. 1000 milliLiter(s) (75 mL/Hr) IV Continuous <Continuous>  heparin   Injectable 5000 Unit(s) SubCutaneous every 8 hours  insulin regular Infusion 10 Unit(s)/Hr (10 mL/Hr) IV Continuous <Continuous>  nystatin Cream 1 Application(s) Topical two times a day  pantoprazole  Injectable 40 milliGRAM(s) IV Push daily    MEDICATIONS  (PRN):  acetaminophen    Suspension .. 650 milliGRAM(s) Oral every 6 hours PRN Temp greater or equal to 38C (100.4F)      03-20-21 @ 07:01  -  03-21-21 @ 07:00  --------------------------------------------------------  IN: 3120.2 mL / OUT: 3425 mL / NET: -304.8 mL    03-21-21 @ 07:01  -  03-21-21 @ 23:44  --------------------------------------------------------  IN: 2569.9 mL / OUT: 2250 mL / NET: 319.9 mL      PHYSICAL EXAM:      T(C): 38.6 (03-21-21 @ 23:09), Max: 38.6 (03-21-21 @ 11:35)  HR: 120 (03-21-21 @ 22:00) (76 - 120)  BP: 141/80 (03-21-21 @ 22:00) (115/64 - 149/83)  RR: 20 (03-21-21 @ 22:00) (13 - 23)  SpO2: 92% (03-21-21 @ 22:00) (85% - 97%)  Wt(kg): --  Lungs clear  Heart S1S2  Abd soft NT ND  Extremities:   tr edema                                    13.0   9.75  )-----------( 102      ( 21 Mar 2021 03:57 )             42.6     03-21    151<H>  |  125<H>  |  23  ----------------------------<  379<H>  5.4<H>   |  21<L>  |  1.53<H>    Ca    8.9      21 Mar 2021 21:49  Phos  1.8     03-21  Mg     2.9     03-21    TPro  7.1  /  Alb  3.0<L>  /  TBili  0.6  /  DBili  x   /  AST  146<H>  /  ALT  56  /  AlkPhos  76  03-21    ABG - ( 20 Mar 2021 10:57 )  pH, Arterial: x     pH, Blood: 7.35  /  pCO2: 38    /  pO2: 135   / HCO3: 21    / Base Excess: -4.0  /  SaO2: 99                LIVER FUNCTIONS - ( 21 Mar 2021 03:57 )  Alb: 3.0 g/dL / Pro: 7.1 gm/dL / ALK PHOS: 76 U/L / ALT: 56 U/L / AST: 146 U/L / GGT: x           Creatinine Trend: 1.53<--, 1.89<--, 3.11<--, 4.48<--, 5.00<--, 4.33<--        Assessment   ASHLEE, DKA, hypernatremia; pre renal azotemia; acute HTN injury;   R/O occult secondary GN, CTD/ vasculitic related, pulm renal disease  Slowly improving     Plan  Hypotonic IVF D5W  UA micro  CPK trend    Eddy Bello MD

## 2021-03-21 NOTE — PROGRESS NOTE ADULT - ASSESSMENT
25M no known PMH presents for lethargy, AMS, agitation. Found to have a blood sugar level of 1374. Cr 5.2 and , anion gap 29 on admission. COVID PCR negative however with + COVID Ab. Admitted for DKA, severe dehydration, acute renal insufficiency, uremia, acute metabolic encephalopathy, sepsis with septic shock. Course complicated by acute hypoxic respiratory failure requiring intubation 3/19. Extubated today 3/20. Febrile. Hypernatremic. Staph PNA    - cont sedation with precedex drip for underlying agitation, weaning down sedation today  - unclear reason for encephalopathy: infectious vs metabolic  - CT head unrevealing  - U tox negative  - off levophed with stable BP  - DKA resolved with insulin drip and IVF  - NPO due to sedation for underlying agitation, advance diet as mental status improves   - cont background IVF D5 to treat hypernatremia   - however remains on insulin drip for better glycemic control, blood sugar in the 200s and while pt is on D5 gtt  - Cr improving, urinating, monitor output and renal function  - cont antibx, sputum cx with staph. treat for underlying PNA  - hypoxic on RA, supplement with 2L NC    Critical Care Time: 40 min

## 2021-03-21 NOTE — PROGRESS NOTE ADULT - SUBJECTIVE AND OBJECTIVE BOX
24 hr events:      ## ROS:  [ ] unable to obtain  CONSTITUTIONAL: No fever, weight loss, or fatigue  EYES: No eye pain, visual disturbances, or discharge  ENMT:  No difficulty hearing, tinnitus, vertigo; No sinus or throat pain  NECK: No pain or stiffness  RESPIRATORY: No cough, wheezing, chills or hemoptysis; No shortness of breath  CARDIOVASCULAR: No chest pain, palpitations, dizziness, or leg swelling  GASTROINTESTINAL: No abdominal or epigastric pain. No nausea, vomiting, or hematemesis; No diarrhea or constipation. No melena or hematochezia.  GENITOURINARY: No dysuria, frequency, hematuria, or incontinence  NEUROLOGICAL: No headaches, memory loss, loss of strength, numbness, or tremors  SKIN: No itching, burning, rashes, or lesions   LYMPH NODES: No enlarged glands  ENDOCRINE: No heat or cold intolerance; No hair loss  MUSCULOSKELETAL: No joint pain or swelling; No muscle, back, or extremity pain  PSYCHIATRIC: No depression, anxiety, mood swings, or difficulty sleeping  HEME/LYMPH: No easy bruising, or bleeding gums  ALLERGY AND IMMUNOLOGIC: No hives or eczema    ## Labs:  CBC:                        13.0   9.75  )-----------( 102      ( 21 Mar 2021 03:57 )             42.6     Chem:  03-21    159<H>  |  128<H>  |  39<H>  ----------------------------<  241<H>  4.3   |  26  |  1.89<H>    Ca    9.1      21 Mar 2021 03:57  Phos  1.9     03-21  Mg     3.6     03-21    TPro  7.1  /  Alb  3.0<L>  /  TBili  0.6  /  DBili  x   /  AST  146<H>  /  ALT  56  /  AlkPhos  76  03-21    Coags:          ## Imaging:    ## Medications:  cefepime   IVPB 2000 milliGRAM(s) IV Intermittent every 24 hours  cefepime   IVPB            insulin regular Infusion 10 Unit(s)/Hr IV Continuous <Continuous>    heparin   Injectable 5000 Unit(s) SubCutaneous every 8 hours    pantoprazole  Injectable 40 milliGRAM(s) IV Push daily    dexMEDEtomidine Infusion 0.2 MICROgram(s)/kG/Hr IV Continuous <Continuous>      ## Vitals:  T(C): 37.2 (03-21-21 @ 07:15), Max: 38.2 (03-20-21 @ 15:00)  HR: 82 (03-21-21 @ 07:00) (64 - 105)  BP: 134/61 (03-21-21 @ 07:00) (115/64 - 168/114)  BP(mean): 78 (03-21-21 @ 07:00) (65 - 126)  RR: 17 (03-21-21 @ 07:00) (13 - 22)  SpO2: 96% (03-21-21 @ 07:00) (91% - 100%)  Wt(kg): --  Vent:   ABG: ABG - ( 20 Mar 2021 10:57 )  pH, Arterial: x     pH, Blood: 7.35  /  pCO2: 38    /  pO2: 135   / HCO3: 21    / Base Excess: -4.0  /  SaO2: 99                    03-20 @ 07:01  -  03-21 @ 07:00  --------------------------------------------------------  IN: 3120.2 mL / OUT: 3425 mL / NET: -304.8 mL    03-21 @ 07:01  -  03-21 @ 10:26  --------------------------------------------------------  IN: 0 mL / OUT: 500 mL / NET: -500 mL          ## P/E:  Gen: lying comfortably in bed in no apparent distress  HEENT: PERRL, EOMI  Resp: CTA B/L no c/r/w  CVS: S1S2 no m/r/g  Abd: soft NT/ND +BS  Ext: no c/c/e  Neuro: A&Ox3    CENTRAL LINE: [ ] YES [ ] NO  LOCATION:   DATE INSERTED:  REMOVE: [ ] YES [ ] NO      VALDEZ: [ ] YES [ ] NO    DATE INSERTED:  REMOVE:  [ ] YES [ ] NO      A-LINE:  [ ] YES [ ] NO  LOCATION:   DATE INSERTED:  REMOVE:  [ ] YES [ ] NO  EXPLAIN:    GLOBAL ISSUE/BEST PRACTICE:  Analgesia:  Sedation:  HOB elevation: yes  Stress ulcer prophylaxis:  VTE prophylaxis:  Oral Care:  Glycemic control:  Nutrition:    CODE STATUS: [ ] full code  [ ] DNR  [ ] DNI  [ ] MOLST  Goals of care discussion: [ ] yes  24 hr events:  extubated yesterday  extremely agitated yesterday post extubation placed on 1:1 and precedex  weaning down precedex today  has been much calmer and cooperative  answering questions, still a bit confused  hypernatremic  febrile 101.4  desating on RA with O2 sat 88%    ## ROS:  [x] unable to obtain due to confusion / encephalopathy     ## Labs:  CBC:                        13.0   9.75  )-----------( 102      ( 21 Mar 2021 03:57 )             42.6     Chem:  03-21    159<H>  |  128<H>  |  39<H>  ----------------------------<  241<H>  4.3   |  26  |  1.89<H>    Ca    9.1      21 Mar 2021 03:57  Phos  1.9     03-21  Mg     3.6     03-21    TPro  7.1  /  Alb  3.0<L>  /  TBili  0.6  /  DBili  x   /  AST  146<H>  /  ALT  56  /  AlkPhos  76  03-21    Culture - Sputum . (03.20.21 @ 00:46)     Specimen Source: .Sputum Sputum    Culture Results:   Numerous Staphylococcus aureus  Normal Respiratory Zoila present      Culture - Urine (03.19.21 @ 09:22)    Specimen Source: .Urine Clean Catch (Midstream)    Culture Results: <10,000 CFU/mL Normal Urogenital Zoila      Culture - Blood (03.19.21 @ 09:10)    Specimen Source: .Blood Blood-Peripheral    Culture Results: No growth to date.      ## Medications:  cefepime   IVPB 2000 milliGRAM(s) IV Intermittent every 24 hours    insulin regular Infusion 10 Unit(s)/Hr IV Continuous <Continuous>    heparin   Injectable 5000 Unit(s) SubCutaneous every 8 hours    pantoprazole  Injectable 40 milliGRAM(s) IV Push daily    dexMEDEtomidine Infusion 0.2 MICROgram(s)/kG/Hr IV Continuous <Continuous>      ## Vitals:  T(C): 37.2 (03-21-21 @ 07:15), Max: 38.2 (03-20-21 @ 15:00)  HR: 82 (03-21-21 @ 07:00) (64 - 105)  BP: 134/61 (03-21-21 @ 07:00) (115/64 - 168/114)  BP(mean): 78 (03-21-21 @ 07:00) (65 - 126)  RR: 17 (03-21-21 @ 07:00) (13 - 22)  SpO2: 96% (03-21-21 @ 07:00) (91% - 100%)    ABG: ABG - ( 20 Mar 2021 10:57 )  pH, Arterial: x     pH, Blood: 7.35  /  pCO2: 38    /  pO2: 135   / HCO3: 21    / Base Excess: -4.0  /  SaO2: 99          03-20 @ 07:01  -  03-21 @ 07:00  --------------------------------------------------------  IN: 3120.2 mL / OUT: 3425 mL / NET: -304.8 mL    03-21 @ 07:01  -  03-21 @ 10:26  --------------------------------------------------------  IN: 0 mL / OUT: 500 mL / NET: -500 mL          ## P/E:  Gen: lying comfortably in bed in no apparent distress  HEENT: PERRL, EOMI  Resp: bilateral rhonchi, no wheeze  CVS: RRR  Abd: soft NT/ND +BS  Ext: no c/c/e  Neuro: lightly sedated, A&Ox2 (person and place)    CENTRAL LINE: [ ] YES [x ] NO  LOCATION:   DATE INSERTED:  REMOVE: [ ] YES [ ] NO      VALDEZ: [ ] YES [ x] NO    DATE INSERTED:  REMOVE:  [ ] YES [ ] NO      A-LINE:  [ ] YES [x ] NO  LOCATION:   DATE INSERTED:  REMOVE:  [ ] YES [ ] NO  EXPLAIN:    GLOBAL ISSUE/BEST PRACTICE:  Analgesia: na  Sedation: y  HOB elevation: yes  Stress ulcer prophylaxis: y  VTE prophylaxis: y  Oral Care: na  Glycemic control: y  Nutrition: po diet if tolerates    CODE STATUS: [ x] full code  [ ] DNR  [ ] DNI  [ ] MOLST  Goals of care discussion: [ ] yes

## 2021-03-21 NOTE — PROGRESS NOTE ADULT - PROBLEM SELECTOR PLAN 1
Diabetic Ketoacidosis resolved   Continue with the same regimen while inpatient  till mental status improves

## 2021-03-21 NOTE — PROGRESS NOTE ADULT - SUBJECTIVE AND OBJECTIVE BOX
Patient is a 25y old  Male who presents with a chief complaint of     Interval History: finger sticks are stable   change of mental status   continued on Dextrose 5% water and IV insulin drip     MEDICATIONS  (STANDING):  cefepime   IVPB      cefepime   IVPB 2000 milliGRAM(s) IV Intermittent every 24 hours  chlorhexidine 2% Cloths 1 Application(s) Topical daily  dexMEDEtomidine Infusion 0.2 MICROgram(s)/kG/Hr (5.9 mL/Hr) IV Continuous <Continuous>  dextrose 5%. 1000 milliLiter(s) (125 mL/Hr) IV Continuous <Continuous>  heparin   Injectable 5000 Unit(s) SubCutaneous every 8 hours  insulin regular Infusion 10 Unit(s)/Hr (10 mL/Hr) IV Continuous <Continuous>  nystatin Cream 1 Application(s) Topical two times a day  pantoprazole  Injectable 40 milliGRAM(s) IV Push daily    MEDICATIONS  (PRN):      Allergies    No Known Allergies    Intolerances        REVIEW OF SYSTEMS:  CONSTITUTIONAL: no changes    Vital Signs Last 24 Hrs  T(C): 38.6 (21 Mar 2021 11:35), Max: 38.6 (21 Mar 2021 11:35)  T(F): 101.4 (21 Mar 2021 11:35), Max: 101.4 (21 Mar 2021 11:35)  HR: 90 (21 Mar 2021 13:00) (76 - 102)  BP: 127/65 (21 Mar 2021 13:00) (115/64 - 153/90)  BP(mean): 81 (21 Mar 2021 13:00) (65 - 100)  RR: 21 (21 Mar 2021 13:00) (13 - 22)  SpO2: 92% (21 Mar 2021 13:00) (85% - 100%)    PHYSICAL EXAM:  GENERAL: as per the progress notes of Primary Team    HEAD: Atraumatic, Normocephalic    LABS:      Urinalysis Basic - ( 19 Mar 2021 22:18 )    Color: Yellow / Appearance: very cloudy / S.025 / pH: x  Gluc: x / Ketone: Small  / Bili: Negative / Urobili: Negative mg/dL   Blood: x / Protein: 100 mg/dL / Nitrite: Negative   Leuk Esterase: Moderate / RBC: 3-5 /HPF / WBC 11-25   Sq Epi: x / Non Sq Epi: Occasional / Bacteria: Many      CAPILLARY BLOOD GLUCOSE      POCT Blood Glucose.: 353 mg/dL (21 Mar 2021 13:28)  POCT Blood Glucose.: 346 mg/dL (21 Mar 2021 12:51)  POCT Blood Glucose.: 299 mg/dL (21 Mar 2021 11:49)  POCT Blood Glucose.: 333 mg/dL (21 Mar 2021 10:32)  POCT Blood Glucose.: 287 mg/dL (21 Mar 2021 09:02)  POCT Blood Glucose.: 310 mg/dL (21 Mar 2021 08:02)  POCT Blood Glucose.: 265 mg/dL (21 Mar 2021 06:47)  POCT Blood Glucose.: 264 mg/dL (21 Mar 2021 05:29)  POCT Blood Glucose.: 263 mg/dL (21 Mar 2021 02:25)  POCT Blood Glucose.: 262 mg/dL (20 Mar 2021 23:31)  POCT Blood Glucose.: 286 mg/dL (20 Mar 2021 22:35)  POCT Blood Glucose.: 357 mg/dL (20 Mar 2021 21:16)  POCT Blood Glucose.: 385 mg/dL (20 Mar 2021 20:13)  POCT Blood Glucose.: 302 mg/dL (20 Mar 2021 18:41)  POCT Blood Glucose.: 339 mg/dL (20 Mar 2021 17:50)  POCT Blood Glucose.: 328 mg/dL (20 Mar 2021 16:49)  POCT Blood Glucose.: 362 mg/dL (20 Mar 2021 15:52)  POCT Blood Glucose.: 297 mg/dL (20 Mar 2021 14:56)    Lipid panel:   CARDIAC MARKERS ( 21 Mar 2021 03:57 )  x     / x     / 9245 U/L / x     / x      CARDIAC MARKERS ( 19 Mar 2021 22:18 )  x     / x     / 3595 U/L / x     / x          ABG - ( 20 Mar 2021 10:57 )  pH, Arterial: x     pH, Blood: 7.35  /  pCO2: 38    /  pO2: 135   / HCO3: 21    / Base Excess: -4.0  /  SaO2: 99                  Thyroid:  Diabetes Tests:  Parathyroid Panel:  Adrenals:  RADIOLOGY & ADDITIONAL TESTS:    Imaging Personally Reviewed:  [ ] YES  [ ] NO    Consultant(s) Notes Reviewed:  [ ] YES  [ ] NO    Care Discussed with Consultants/Other Providers [ ] YES  [ ] NO

## 2021-03-22 LAB
ALBUMIN SERPL ELPH-MCNC: 2.9 G/DL — LOW (ref 3.3–5)
ALP SERPL-CCNC: 79 U/L — SIGNIFICANT CHANGE UP (ref 40–120)
ALT FLD-CCNC: 59 U/L — SIGNIFICANT CHANGE UP (ref 12–78)
ANION GAP SERPL CALC-SCNC: 4 MMOL/L — LOW (ref 5–17)
AST SERPL-CCNC: 98 U/L — HIGH (ref 15–37)
BILIRUB DIRECT SERPL-MCNC: 0.12 MG/DL — SIGNIFICANT CHANGE UP (ref 0.05–0.2)
BILIRUB SERPL-MCNC: 0.7 MG/DL — SIGNIFICANT CHANGE UP (ref 0.2–1.2)
BUN SERPL-MCNC: 22 MG/DL — SIGNIFICANT CHANGE UP (ref 7–23)
C TRACH RRNA SPEC QL NAA+PROBE: SIGNIFICANT CHANGE UP
CALCIUM SERPL-MCNC: 9.4 MG/DL — SIGNIFICANT CHANGE UP (ref 8.5–10.1)
CHLORIDE SERPL-SCNC: 119 MMOL/L — HIGH (ref 96–108)
CHOLEST SERPL-MCNC: 196 MG/DL — SIGNIFICANT CHANGE UP
CO2 SERPL-SCNC: 26 MMOL/L — SIGNIFICANT CHANGE UP (ref 22–31)
CREAT SERPL-MCNC: 1.44 MG/DL — HIGH (ref 0.5–1.3)
GLUCOSE BLDC GLUCOMTR-MCNC: 256 MG/DL — HIGH (ref 70–99)
GLUCOSE BLDC GLUCOMTR-MCNC: 262 MG/DL — HIGH (ref 70–99)
GLUCOSE BLDC GLUCOMTR-MCNC: 279 MG/DL — HIGH (ref 70–99)
GLUCOSE BLDC GLUCOMTR-MCNC: 283 MG/DL — HIGH (ref 70–99)
GLUCOSE BLDC GLUCOMTR-MCNC: 288 MG/DL — HIGH (ref 70–99)
GLUCOSE BLDC GLUCOMTR-MCNC: 298 MG/DL — HIGH (ref 70–99)
GLUCOSE BLDC GLUCOMTR-MCNC: 304 MG/DL — HIGH (ref 70–99)
GLUCOSE BLDC GLUCOMTR-MCNC: 321 MG/DL — HIGH (ref 70–99)
GLUCOSE BLDC GLUCOMTR-MCNC: 327 MG/DL — HIGH (ref 70–99)
GLUCOSE BLDC GLUCOMTR-MCNC: 332 MG/DL — HIGH (ref 70–99)
GLUCOSE BLDC GLUCOMTR-MCNC: 414 MG/DL — HIGH (ref 70–99)
GLUCOSE SERPL-MCNC: 314 MG/DL — HIGH (ref 70–99)
HCT VFR BLD CALC: 42.4 % — SIGNIFICANT CHANGE UP (ref 39–50)
HDLC SERPL-MCNC: 41 MG/DL — SIGNIFICANT CHANGE UP
HGB BLD-MCNC: 13 G/DL — SIGNIFICANT CHANGE UP (ref 13–17)
LIDOCAIN IGE QN: 1821 U/L — HIGH (ref 73–393)
LIPID PNL WITH DIRECT LDL SERPL: 121 MG/DL — HIGH
MAGNESIUM SERPL-MCNC: 2.8 MG/DL — HIGH (ref 1.6–2.6)
MCHC RBC-ENTMCNC: 26.6 PG — LOW (ref 27–34)
MCHC RBC-ENTMCNC: 30.7 GM/DL — LOW (ref 32–36)
MCV RBC AUTO: 86.9 FL — SIGNIFICANT CHANGE UP (ref 80–100)
N GONORRHOEA RRNA SPEC QL NAA+PROBE: SIGNIFICANT CHANGE UP
NON HDL CHOLESTEROL: 155 MG/DL — HIGH
NRBC # BLD: 0 /100 WBCS — SIGNIFICANT CHANGE UP (ref 0–0)
PHOSPHATE SERPL-MCNC: 2.5 MG/DL — SIGNIFICANT CHANGE UP (ref 2.5–4.5)
PLATELET # BLD AUTO: 106 K/UL — LOW (ref 150–400)
POTASSIUM SERPL-MCNC: 4.2 MMOL/L — SIGNIFICANT CHANGE UP (ref 3.5–5.3)
POTASSIUM SERPL-SCNC: 4.2 MMOL/L — SIGNIFICANT CHANGE UP (ref 3.5–5.3)
PROT SERPL-MCNC: 7.3 GM/DL — SIGNIFICANT CHANGE UP (ref 6–8.3)
RBC # BLD: 4.88 M/UL — SIGNIFICANT CHANGE UP (ref 4.2–5.8)
RBC # FLD: 14.6 % — HIGH (ref 10.3–14.5)
SODIUM SERPL-SCNC: 149 MMOL/L — HIGH (ref 135–145)
SPECIMEN SOURCE: SIGNIFICANT CHANGE UP
TRIGL SERPL-MCNC: 173 MG/DL — HIGH
WBC # BLD: 11.96 K/UL — HIGH (ref 3.8–10.5)
WBC # FLD AUTO: 11.96 K/UL — HIGH (ref 3.8–10.5)

## 2021-03-22 PROCEDURE — 99233 SBSQ HOSP IP/OBS HIGH 50: CPT

## 2021-03-22 PROCEDURE — 93010 ELECTROCARDIOGRAM REPORT: CPT

## 2021-03-22 PROCEDURE — 71045 X-RAY EXAM CHEST 1 VIEW: CPT | Mod: 26

## 2021-03-22 RX ORDER — SODIUM CHLORIDE 9 MG/ML
1000 INJECTION, SOLUTION INTRAVENOUS
Refills: 0 | Status: DISCONTINUED | OUTPATIENT
Start: 2021-03-22 | End: 2021-03-26

## 2021-03-22 RX ORDER — INSULIN LISPRO 100/ML
5 VIAL (ML) SUBCUTANEOUS
Refills: 0 | Status: DISCONTINUED | OUTPATIENT
Start: 2021-03-22 | End: 2021-03-22

## 2021-03-22 RX ORDER — GLUCAGON INJECTION, SOLUTION 0.5 MG/.1ML
1 INJECTION, SOLUTION SUBCUTANEOUS ONCE
Refills: 0 | Status: DISCONTINUED | OUTPATIENT
Start: 2021-03-22 | End: 2021-03-26

## 2021-03-22 RX ORDER — INSULIN LISPRO 100/ML
5 VIAL (ML) SUBCUTANEOUS
Refills: 0 | Status: DISCONTINUED | OUTPATIENT
Start: 2021-03-23 | End: 2021-03-25

## 2021-03-22 RX ORDER — DEXTROSE 50 % IN WATER 50 %
15 SYRINGE (ML) INTRAVENOUS ONCE
Refills: 0 | Status: DISCONTINUED | OUTPATIENT
Start: 2021-03-22 | End: 2021-03-26

## 2021-03-22 RX ORDER — INSULIN GLARGINE 100 [IU]/ML
20 INJECTION, SOLUTION SUBCUTANEOUS EVERY MORNING
Refills: 0 | Status: DISCONTINUED | OUTPATIENT
Start: 2021-03-22 | End: 2021-03-24

## 2021-03-22 RX ORDER — SODIUM CHLORIDE 9 MG/ML
1000 INJECTION, SOLUTION INTRAVENOUS
Refills: 0 | Status: DISCONTINUED | OUTPATIENT
Start: 2021-03-22 | End: 2021-03-23

## 2021-03-22 RX ORDER — DEXTROSE 50 % IN WATER 50 %
25 SYRINGE (ML) INTRAVENOUS ONCE
Refills: 0 | Status: DISCONTINUED | OUTPATIENT
Start: 2021-03-22 | End: 2021-03-26

## 2021-03-22 RX ORDER — SENNA PLUS 8.6 MG/1
2 TABLET ORAL AT BEDTIME
Refills: 0 | Status: DISCONTINUED | OUTPATIENT
Start: 2021-03-22 | End: 2021-03-26

## 2021-03-22 RX ORDER — INSULIN LISPRO 100/ML
VIAL (ML) SUBCUTANEOUS AT BEDTIME
Refills: 0 | Status: DISCONTINUED | OUTPATIENT
Start: 2021-03-22 | End: 2021-03-26

## 2021-03-22 RX ORDER — INSULIN LISPRO 100/ML
5 VIAL (ML) SUBCUTANEOUS
Refills: 0 | Status: DISCONTINUED | OUTPATIENT
Start: 2021-03-22 | End: 2021-03-25

## 2021-03-22 RX ORDER — DEXTROSE 50 % IN WATER 50 %
12.5 SYRINGE (ML) INTRAVENOUS ONCE
Refills: 0 | Status: DISCONTINUED | OUTPATIENT
Start: 2021-03-22 | End: 2021-03-26

## 2021-03-22 RX ORDER — INSULIN LISPRO 100/ML
VIAL (ML) SUBCUTANEOUS
Refills: 0 | Status: DISCONTINUED | OUTPATIENT
Start: 2021-03-22 | End: 2021-03-26

## 2021-03-22 RX ADMIN — Medication 3: at 10:59

## 2021-03-22 RX ADMIN — Medication 650 MILLIGRAM(S): at 00:06

## 2021-03-22 RX ADMIN — Medication 4: at 21:51

## 2021-03-22 RX ADMIN — HEPARIN SODIUM 5000 UNIT(S): 5000 INJECTION INTRAVENOUS; SUBCUTANEOUS at 21:52

## 2021-03-22 RX ADMIN — HEPARIN SODIUM 5000 UNIT(S): 5000 INJECTION INTRAVENOUS; SUBCUTANEOUS at 05:55

## 2021-03-22 RX ADMIN — Medication 4: at 15:35

## 2021-03-22 RX ADMIN — HEPARIN SODIUM 5000 UNIT(S): 5000 INJECTION INTRAVENOUS; SUBCUTANEOUS at 14:00

## 2021-03-22 RX ADMIN — Medication 5 UNIT(S): at 17:25

## 2021-03-22 RX ADMIN — NYSTATIN CREAM 1 APPLICATION(S): 100000 CREAM TOPICAL at 17:26

## 2021-03-22 RX ADMIN — PANTOPRAZOLE SODIUM 40 MILLIGRAM(S): 20 TABLET, DELAYED RELEASE ORAL at 11:57

## 2021-03-22 RX ADMIN — CHLORHEXIDINE GLUCONATE 1 APPLICATION(S): 213 SOLUTION TOPICAL at 11:57

## 2021-03-22 RX ADMIN — INSULIN GLARGINE 20 UNIT(S): 100 INJECTION, SOLUTION SUBCUTANEOUS at 09:34

## 2021-03-22 RX ADMIN — NYSTATIN CREAM 1 APPLICATION(S): 100000 CREAM TOPICAL at 05:54

## 2021-03-22 RX ADMIN — CEFEPIME 100 MILLIGRAM(S): 1 INJECTION, POWDER, FOR SOLUTION INTRAMUSCULAR; INTRAVENOUS at 10:59

## 2021-03-22 RX ADMIN — SODIUM CHLORIDE 75 MILLILITER(S): 9 INJECTION, SOLUTION INTRAVENOUS at 20:04

## 2021-03-22 RX ADMIN — Medication 650 MILLIGRAM(S): at 03:12

## 2021-03-22 NOTE — PROGRESS NOTE ADULT - SUBJECTIVE AND OBJECTIVE BOX
24 hr events:  mental status significantly improved  titrated off insulin drip this morning and transitioned to lantus  off precedex drip    ## ROS:  no fever, no chills  no HA, no visual changes  no CP  no SOB, no cough  denies abdominal pain, no nausea, no emesis  no dysuria  no myalgias     ## Labs:  CBC:                        13.0   11.96 )-----------( 106      ( 22 Mar 2021 04:33 )             42.4     Chem:  03-22    149<H>  |  119<H>  |  22  ----------------------------<  314<H>  4.2   |  26  |  1.44<H>    Ca    9.4      22 Mar 2021 04:33  Phos  2.5     03-22  Mg     2.8     03-22    TPro  7.3  /  Alb  2.9<L>  /  TBili  0.7  /  DBili  .12  /  AST  98<H>  /  ALT  59  /  AlkPhos  79  03-22    Culture - Sputum . (03.20.21 @ 00:46)    Specimen Source: .Sputum Sputum    Culture Results:   Numerous Staphylococcus aureus  Normal Respiratory Zoila present      Culture - Blood (03.19.21 @ 09:10)    Specimen Source: .Blood Blood-Peripheral    Culture Results: No growth to date.        ## Medications:      cefepime   IVPB 2000 milliGRAM(s) IV Intermittent every 24 hours        dextrose 40% Gel 15 Gram(s) Oral once  dextrose 50% Injectable 25 Gram(s) IV Push once  dextrose 50% Injectable 12.5 Gram(s) IV Push once  dextrose 50% Injectable 25 Gram(s) IV Push once  glucagon  Injectable 1 milliGRAM(s) IntraMuscular once  insulin glargine Injectable (LANTUS) 20 Unit(s) SubCutaneous every morning  insulin lispro (ADMELOG) corrective regimen sliding scale   SubCutaneous three times a day before meals  insulin lispro (ADMELOG) corrective regimen sliding scale   SubCutaneous at bedtime  insulin lispro Injectable (ADMELOG) 5 Unit(s) SubCutaneous before dinner  insulin lispro Injectable (ADMELOG) 5 Unit(s) SubCutaneous before breakfast  insulin lispro Injectable (ADMELOG) 5 Unit(s) SubCutaneous before lunch    heparin   Injectable 5000 Unit(s) SubCutaneous every 8 hours    pantoprazole  Injectable 40 milliGRAM(s) IV Push daily  senna 2 Tablet(s) Oral at bedtime    acetaminophen    Suspension .. 650 milliGRAM(s) Oral every 6 hours PRN      ## Vitals:  T(C): 36.8 (03-22-21 @ 23:31), Max: 37.8 (03-22-21 @ 04:00)  HR: 107 (03-22-21 @ 23:31) (91 - 116)  BP: 161/85 (03-22-21 @ 23:31) (135/90 - 162/79)  BP(mean): 115 (03-22-21 @ 18:59) (94 - 116)  RR: 18 (03-22-21 @ 23:31) (10 - 30)  SpO2: 97% (03-22-21 @ 23:31) (93% - 100%)        03-21 @ 07:01  -  03-22 @ 07:00  --------------------------------------------------------  IN: 3308.9 mL / OUT: 3400 mL / NET: -91.1 mL    03-22 @ 07:01  -  03-23 @ 00:24  --------------------------------------------------------  IN: 1073 mL / OUT: 1050 mL / NET: 23 mL          ## P/E:  Gen: lying comfortably in bed in no apparent distress, calm and cooperative  HEENT: PERRL, EOMI  Resp: CTA B/L   CVS: RRR  Abd: soft NT/ND +BS  Ext: no c/c/e  Neuro: A&Ox3, unable to recall events leading to hospitalization    CENTRAL LINE: [ ] YES [x ] NO  LOCATION:   DATE INSERTED:  REMOVE: [ ] YES [ ] NO      VALDEZ: [ ] YES [x ] NO    DATE INSERTED:  REMOVE:  [ ] YES [ ] NO      A-LINE:  [ ] YES [x ] NO  LOCATION:   DATE INSERTED:  REMOVE:  [ ] YES [ ] NO  EXPLAIN:    GLOBAL ISSUE/BEST PRACTICE:  Analgesia: n/a  Sedation: n/a  HOB elevation: yes  Stress ulcer prophylaxis: n/a  VTE prophylaxis: y  Oral Care: n/a  Glycemic control: y  Nutrition: po diet    CODE STATUS: [x ] full code  [ ] DNR  [ ] DNI  [ ] MOLST  Goals of care discussion: [ ] yes

## 2021-03-22 NOTE — PROGRESS NOTE ADULT - SUBJECTIVE AND OBJECTIVE BOX
[facility] NEPHROLOGY SERVICES, [facility]  NEPHROLOGY AND HYPERTENSION  [address]  [address]  [place] [address]  [phone]    MD XOCHILT LOPEZ MD ANDREY GONCHARUK, MD MADHU KORRAPATI, MD YELENA ROSENBERG, MD BINNY KOSHY, MD CHRISTOPHER CAPUTO, MD EDWARD BOVER, MD          Patient events noted feels well     MEDICATIONS  (STANDING):  cefepime   IVPB      cefepime   IVPB 2000 milliGRAM(s) IV Intermittent every 24 hours  dextrose 40% Gel 15 Gram(s) Oral once  dextrose 5%. 1000 milliLiter(s) (50 mL/Hr) IV Continuous <Continuous>  dextrose 5%. 1000 milliLiter(s) (100 mL/Hr) IV Continuous <Continuous>  dextrose 50% Injectable 25 Gram(s) IV Push once  dextrose 50% Injectable 12.5 Gram(s) IV Push once  dextrose 50% Injectable 25 Gram(s) IV Push once  glucagon  Injectable 1 milliGRAM(s) IntraMuscular once  heparin   Injectable 5000 Unit(s) SubCutaneous every 8 hours  insulin glargine Injectable (LANTUS) 20 Unit(s) SubCutaneous every morning  insulin lispro (ADMELOG) corrective regimen sliding scale   SubCutaneous three times a day before meals  insulin lispro (ADMELOG) corrective regimen sliding scale   SubCutaneous at bedtime  insulin lispro Injectable (ADMELOG) 5 Unit(s) SubCutaneous before dinner  nystatin Cream 1 Application(s) Topical two times a day  pantoprazole  Injectable 40 milliGRAM(s) IV Push daily  senna 2 Tablet(s) Oral at bedtime  sodium chloride 0.45%. 1000 milliLiter(s) (75 mL/Hr) IV Continuous <Continuous>    MEDICATIONS  (PRN):  acetaminophen    Suspension .. 650 milliGRAM(s) Oral every 6 hours PRN Temp greater or equal to 38C (100.4F)      03-21-21 @ 07:01  -  03-22-21 @ 07:00  --------------------------------------------------------  IN: 3308.9 mL / OUT: 3400 mL / NET: -91.1 mL    03-22-21 @ 07:01  -  03-22-21 @ 21:12  --------------------------------------------------------  IN: 1073 mL / OUT: 1050 mL / NET: 23 mL      PHYSICAL EXAM:      T(C): 36.9 (03-22-21 @ 12:00), Max: 38.6 (03-21-21 @ 23:09)  HR: 112 (03-22-21 @ 18:59) (91 - 120)  BP: 153/95 (03-22-21 @ 18:59) (135/90 - 162/79)  RR: 19 (03-22-21 @ 18:59) (10 - 30)  SpO2: 98% (03-22-21 @ 18:59) (92% - 100%)  Wt(kg): --  Lungs clear  Heart S1S2  Abd soft NT ND  Extremities:   tr edema                                    13.0   11.96 )-----------( 106      ( 22 Mar 2021 04:33 )             42.4     03-22    149<H>  |  119<H>  |  22  ----------------------------<  314<H>  4.2   |  26  |  1.44<H>    Ca    9.4      22 Mar 2021 04:33  Phos  2.5     03-22  Mg     2.8     03-22    TPro  7.3  /  Alb  2.9<L>  /  TBili  0.7  /  DBili  .12  /  AST  98<H>  /  ALT  59  /  AlkPhos  79  03-22      LIVER FUNCTIONS - ( 22 Mar 2021 04:33 )  Alb: 2.9 g/dL / Pro: 7.3 gm/dL / ALK PHOS: 79 U/L / ALT: 59 U/L / AST: 98 U/L / GGT: x           Creatinine Trend: 1.44<--, 1.53<--, 1.89<--, 3.11<--, 4.48<--, 5.00<--      Assessment     ASHLEE hypernatremia DKA, pre renal azotemia     Plan:  IVF maintenance   Will follow.     Eddy Bello MD

## 2021-03-22 NOTE — PROGRESS NOTE ADULT - ASSESSMENT
25M no known PMH presents for lethargy, AMS, agitation. Found to have a blood sugar level of 1374. Cr 5.2 and , anion gap 29 on admission. COVID PCR negative however with + COVID Ab. Admitted for DKA, severe dehydration, acute renal insufficiency, uremia, acute metabolic encephalopathy, sepsis with septic shock. Course complicated by acute hypoxic respiratory failure requiring intubation 3/19. Extubated 3/20. Febrile. Hypernatremic. Staph PNA    - off precedex and remains calm and cooperative  - etiology for encephalopathy likely combination of infectious and metabolic  - CT head unrevealing  - U tox pending: but pt states he used marijuana twice a few months ago, denies any other drug use  - off levophed with stable BP  - DKA resolved with insulin drip and IVF, start lantus and premeal insulin coverage  - advance diet as mental status improved   - d/c IVF D5 infusion to treat hypernatremia , pt can take water by mouth now  - Cr improving, urinating, monitor output and renal function  - cont antibx: sputum cx with staph. treat for underlying PNA, leukocytosis improved  - hypoxic on RA, supplement with 2L NC  - stable for transfer to medical service  - PT

## 2021-03-22 NOTE — CHART NOTE - NSCHARTNOTEFT_GEN_A_CORE
26 YO male with no known PMH presents for lethargy, AMS, agitation.   Found to have a blood sugar level of 1374, Cr 5.2 and , anion gap 29. COVID PCR negative however with + COVID Ab.   Admitted for DKA, severe dehydration, acute renal insufficiency, uremia, acute metabolic encephalopathy, sepsis with septic shock.   Course complicated by acute hypoxic respiratory failure requiring intubation 3/19. Extubated 3/20.  Noted to have staph pneumonia in sputum, blood and urine cultures negative.  Continues on cefepime (since 3/19).  Weaned off insulin, bridged with basal/bolus insulin. Tolerating diet. Endocrine following.  Nephrology also following - creatinine continues to improve.  Stable for transfer to medicine at this time, MILADIS Palomares.

## 2021-03-22 NOTE — PROGRESS NOTE ADULT - PROBLEM SELECTOR PLAN 1
Continue with the same regimen while inpatient   Check C-Peptide and if sufficient may need Insulin sensitizers added   glucose toxicity increased , may need more insulin

## 2021-03-22 NOTE — PROGRESS NOTE ADULT - SUBJECTIVE AND OBJECTIVE BOX
Patient: ADAM MCARTHUR [id number] 25y Male                           Internal Medicine Hospitalist Progress Note      No complaints.  No SOB.  No cough / chills.  Reports feeling much better.   Denies FH of DM.     ____________________PHYSICAL EXAM:  GENERAL:  NAD Alert and Oriented x 3   HEENT: NCAT  CARDIOVASCULAR:  S1, S2  LUNGS: CTAB  ABDOMEN:  soft, (-) tenderness, (-) distension, (+) bowel sounds, (-) guarding, (-) rebound (-) rigidity  EXTREMITIES:  no cyanosis / clubbing / edema.   ____________________     VITALS:  Vital Signs Last 24 Hrs  T(C): 36.9 (22 Mar 2021 12:00), Max: 38.6 (21 Mar 2021 23:09)  T(F): 98.5 (22 Mar 2021 12:00), Max: 101.4 (21 Mar 2021 23:09)  HR: 112 (22 Mar 2021 18:59) (91 - 120)  BP: 153/95 (22 Mar 2021 18:59) (135/90 - 162/79)  BP(mean): 115 (22 Mar 2021 18:59) (94 - 116)  RR: 19 (22 Mar 2021 18:59) (10 - 30)  SpO2: 98% (22 Mar 2021 18:59) (92% - 100%) Daily     Daily   CAPILLARY BLOOD GLUCOSE      POCT Blood Glucose.: 321 mg/dL (22 Mar 2021 18:55)  POCT Blood Glucose.: 414 mg/dL (22 Mar 2021 17:20)  POCT Blood Glucose.: 304 mg/dL (22 Mar 2021 15:17)  POCT Blood Glucose.: 283 mg/dL (22 Mar 2021 10:56)  POCT Blood Glucose.: 256 mg/dL (22 Mar 2021 09:32)  POCT Blood Glucose.: 288 mg/dL (22 Mar 2021 07:45)  POCT Blood Glucose.: 262 mg/dL (22 Mar 2021 06:22)  POCT Blood Glucose.: 279 mg/dL (22 Mar 2021 04:40)  POCT Blood Glucose.: 298 mg/dL (22 Mar 2021 03:18)  POCT Blood Glucose.: 327 mg/dL (22 Mar 2021 00:42)  POCT Blood Glucose.: 335 mg/dL (21 Mar 2021 23:44)  POCT Blood Glucose.: 353 mg/dL (21 Mar 2021 22:45)  POCT Blood Glucose.: 500 mg/dL (21 Mar 2021 21:47)  POCT Blood Glucose.: 373 mg/dL (21 Mar 2021 20:05)  POCT Blood Glucose.: 583 mg/dL (21 Mar 2021 20:01)    I&O's Summary    21 Mar 2021 07:01  -  22 Mar 2021 07:00  --------------------------------------------------------  IN: 3308.9 mL / OUT: 3400 mL / NET: -91.1 mL    22 Mar 2021 07:01  -  22 Mar 2021 19:19  --------------------------------------------------------  IN: 1073 mL / OUT: 1050 mL / NET: 23 mL          BACKGROUND:  HEALTH ISSUES - PROBLEM Dx:  Diabetic ketoacidosis without coma associated with type 1 diabetes mellitus  Diabetic ketoacidosis without coma associated with type 1 diabetes mellitus    Diabetic ketoacidosis with coma associated with type 2 diabetes mellitus  Diabetic ketoacidosis with coma associated with type 2 diabetes mellitus          Allergies    No Known Allergies    Intolerances      PAST MEDICAL & SURGICAL HISTORY:  No pertinent past medical history    No significant past surgical history          LABS:                        13.0   11.96 )-----------( 106      ( 22 Mar 2021 04:33 )             42.4       Culture - Sputum (collected 20 Mar 2021 00:46)  Source: .Sputum Sputum  Gram Stain (21 Mar 2021 17:13):    Numerous polymorphonuclear leukocytes per low power field    No Squamous epithelial cells per low power field    Numerous Gram positive cocci in pairs per oil power field    Numerous Gram Variable Rods per oil power field  Final Report (21 Mar 2021 17:13):    Numerous Staphylococcus aureus    Normal Respiratory Zoila present  Organism: Staphylococcus aureus (21 Mar 2021 17:13)  Organism: Staphylococcus aureus (21 Mar 2021 17:13)    03-22    149<H>  |  119<H>  |  22  ----------------------------<  314<H>  4.2   |  26  |  1.44<H>    Ca    9.4      22 Mar 2021 04:33  Phos  2.5     03-22  Mg     2.8     03-22    TPro  7.3  /  Alb  2.9<L>  /  TBili  0.7  /  DBili  .12  /  AST  98<H>  /  ALT  59  /  AlkPhos  79  03-22      LIVER FUNCTIONS - ( 22 Mar 2021 04:33 )  Alb: 2.9 g/dL / Pro: 7.3 gm/dL / ALK PHOS: 79 U/L / ALT: 59 U/L / AST: 98 U/L / GGT: x             CARDIAC MARKERS ( 21 Mar 2021 03:57 )  x     / x     / 9245 U/L / x     / x          Culture - Sputum (collected 20 Mar 2021 00:46)  Source: .Sputum Sputum  Gram Stain (21 Mar 2021 17:13):    Numerous polymorphonuclear leukocytes per low power field    No Squamous epithelial cells per low power field    Numerous Gram positive cocci in pairs per oil power field    Numerous Gram Variable Rods per oil power field  Final Report (21 Mar 2021 17:13):    Numerous Staphylococcus aureus    Normal Respiratory Zoila present  Organism: Staphylococcus aureus (21 Mar 2021 17:13)  Organism: Staphylococcus aureus (21 Mar 2021 17:13)          MEDICATIONS:  MEDICATIONS  (STANDING):  cefepime   IVPB      cefepime   IVPB 2000 milliGRAM(s) IV Intermittent every 24 hours  dextrose 40% Gel 15 Gram(s) Oral once  dextrose 5%. 1000 milliLiter(s) (50 mL/Hr) IV Continuous <Continuous>  dextrose 5%. 1000 milliLiter(s) (100 mL/Hr) IV Continuous <Continuous>  dextrose 50% Injectable 25 Gram(s) IV Push once  dextrose 50% Injectable 12.5 Gram(s) IV Push once  dextrose 50% Injectable 25 Gram(s) IV Push once  glucagon  Injectable 1 milliGRAM(s) IntraMuscular once  heparin   Injectable 5000 Unit(s) SubCutaneous every 8 hours  insulin glargine Injectable (LANTUS) 20 Unit(s) SubCutaneous every morning  insulin lispro (ADMELOG) corrective regimen sliding scale   SubCutaneous three times a day before meals  insulin lispro (ADMELOG) corrective regimen sliding scale   SubCutaneous at bedtime  insulin lispro Injectable (ADMELOG) 5 Unit(s) SubCutaneous before dinner  nystatin Cream 1 Application(s) Topical two times a day  pantoprazole  Injectable 40 milliGRAM(s) IV Push daily  senna 2 Tablet(s) Oral at bedtime    MEDICATIONS  (PRN):  acetaminophen    Suspension .. 650 milliGRAM(s) Oral every 6 hours PRN Temp greater or equal to 38C (100.4F)

## 2021-03-22 NOTE — PROGRESS NOTE ADULT - ASSESSMENT
24yo M with no known PMH presented for lethargy and altered mental status, found to have Glucose 1374, Cr 5.2 and , anion gap 29.  Admitted for DKA, severe dehydration, acute renal insufficiency, uremia, acute metabolic encephalopathy, sepsis with septic shock.  Course complicated by acute hypoxic respiratory failure requiring intubation 3/19. Extubated 3/20.    # DKA - New onset DM - AGAP has normalized.  Discussed need for insulin and diabetic teaching.  Endocrine following.  Continue Admelog, Lantus.  Monitor FS.  # ASHLEE / Dehydration / Hypernatremia - Cr has significantly improved with IVF.  Continue IVF.  Renal followup.  Repeat BMP in am.   # Uremia, Metabolic Encephalopathy - improved.    # S/p Acute Hypoxic Respiratory Failure - due to above, improved.  # Fever, s/p septic shock - attributed to pneumonia.  Staph in sputum.  Continue Cefepime.  ID input in am.  Repeat CXR.  # DVT Prophylaxis - Heparin subcut

## 2021-03-22 NOTE — PROGRESS NOTE ADULT - SUBJECTIVE AND OBJECTIVE BOX
Patient is a 25y old  Male who presents with a chief complaint of     Interval History: stable finger sticks but increased   Diabetic Ketoacidosis resolved  and patient transferred out of ICU   on Lantus 20 units and prandial lispro 5 units     MEDICATIONS  (STANDING):  cefepime   IVPB      cefepime   IVPB 2000 milliGRAM(s) IV Intermittent every 24 hours  dextrose 40% Gel 15 Gram(s) Oral once  dextrose 5%. 1000 milliLiter(s) (50 mL/Hr) IV Continuous <Continuous>  dextrose 5%. 1000 milliLiter(s) (100 mL/Hr) IV Continuous <Continuous>  dextrose 50% Injectable 25 Gram(s) IV Push once  dextrose 50% Injectable 12.5 Gram(s) IV Push once  dextrose 50% Injectable 25 Gram(s) IV Push once  glucagon  Injectable 1 milliGRAM(s) IntraMuscular once  heparin   Injectable 5000 Unit(s) SubCutaneous every 8 hours  insulin glargine Injectable (LANTUS) 20 Unit(s) SubCutaneous every morning  insulin lispro (ADMELOG) corrective regimen sliding scale   SubCutaneous three times a day before meals  insulin lispro (ADMELOG) corrective regimen sliding scale   SubCutaneous at bedtime  insulin lispro Injectable (ADMELOG) 5 Unit(s) SubCutaneous before dinner  nystatin Cream 1 Application(s) Topical two times a day  pantoprazole  Injectable 40 milliGRAM(s) IV Push daily  senna 2 Tablet(s) Oral at bedtime  sodium chloride 0.45%. 1000 milliLiter(s) (75 mL/Hr) IV Continuous <Continuous>    MEDICATIONS  (PRN):  acetaminophen    Suspension .. 650 milliGRAM(s) Oral every 6 hours PRN Temp greater or equal to 38C (100.4F)      Allergies    No Known Allergies    Intolerances        REVIEW OF SYSTEMS:  CONSTITUTIONAL: no changes    Vital Signs Last 24 Hrs  T(C): 36.9 (22 Mar 2021 12:00), Max: 37.8 (22 Mar 2021 04:00)  T(F): 98.5 (22 Mar 2021 12:00), Max: 100 (22 Mar 2021 04:00)  HR: 112 (22 Mar 2021 18:59) (91 - 116)  BP: 153/95 (22 Mar 2021 18:59) (135/90 - 162/79)  BP(mean): 115 (22 Mar 2021 18:59) (94 - 116)  RR: 19 (22 Mar 2021 18:59) (10 - 30)  SpO2: 98% (22 Mar 2021 18:59) (93% - 100%)    PHYSICAL EXAM:  GENERAL:   HEAD: Atraumatic, Normocephalic  EYES: PERRLA, conjunctiva and sclera clear  ENMT: No tonsillar erythema, exudates, or enlargement; Moist mucous membranes, Good dentition, No lesions  NECK: Supple, No JVD, Normal thyroid  NERVOUS SYSTEM:  Alert & Oriented X3, Good concentration; Motor Strength 5/5 B/L upper and lower extremities  CHEST/LUNG: Clear to auscultaion bilaterally; No rales, rhonchi, wheezing, or rubs  HEART: Regular rate and rhythm; No murmurs, rubs, or gallops  ABDOMEN: Soft, Nontender, Nondistended; Bowel sounds present  EXTREMITIES:  2+ Peripheral Pulses, no edema  SKIN: No rashes or lesions    LABS:        CAPILLARY BLOOD GLUCOSE      POCT Blood Glucose.: 332 mg/dL (22 Mar 2021 21:48)  POCT Blood Glucose.: 321 mg/dL (22 Mar 2021 18:55)  POCT Blood Glucose.: 414 mg/dL (22 Mar 2021 17:20)  POCT Blood Glucose.: 304 mg/dL (22 Mar 2021 15:17)  POCT Blood Glucose.: 283 mg/dL (22 Mar 2021 10:56)  POCT Blood Glucose.: 256 mg/dL (22 Mar 2021 09:32)  POCT Blood Glucose.: 288 mg/dL (22 Mar 2021 07:45)  POCT Blood Glucose.: 262 mg/dL (22 Mar 2021 06:22)  POCT Blood Glucose.: 279 mg/dL (22 Mar 2021 04:40)  POCT Blood Glucose.: 298 mg/dL (22 Mar 2021 03:18)  POCT Blood Glucose.: 327 mg/dL (22 Mar 2021 00:42)  POCT Blood Glucose.: 335 mg/dL (21 Mar 2021 23:44)    Lipid panel:   CARDIAC MARKERS ( 21 Mar 2021 03:57 )  x     / x     / 9245 U/L / x     / x              Thyroid:  Diabetes Tests:  Parathyroid Panel:  Adrenals:  RADIOLOGY & ADDITIONAL TESTS:    Imaging Personally Reviewed:  [ ] YES  [ ] NO    Consultant(s) Notes Reviewed:  [ ] YES  [ ] NO    Care Discussed with Consultants/Other Providers [ ] YES  [ ] NO

## 2021-03-23 LAB
ANA TITR SER: NEGATIVE — SIGNIFICANT CHANGE UP
ANION GAP SERPL CALC-SCNC: 13 MMOL/L — SIGNIFICANT CHANGE UP (ref 5–17)
BASOPHILS # BLD AUTO: 0 K/UL — SIGNIFICANT CHANGE UP (ref 0–0.2)
BASOPHILS NFR BLD AUTO: 0 % — SIGNIFICANT CHANGE UP (ref 0–2)
BUN SERPL-MCNC: 30 MG/DL — HIGH (ref 7–23)
CALCIUM SERPL-MCNC: 9.3 MG/DL — SIGNIFICANT CHANGE UP (ref 8.5–10.1)
CHLORIDE SERPL-SCNC: 110 MMOL/L — HIGH (ref 96–108)
CHOLEST SERPL-MCNC: 221 MG/DL — HIGH
CO2 SERPL-SCNC: 18 MMOL/L — LOW (ref 22–31)
CREAT SERPL-MCNC: 1.28 MG/DL — SIGNIFICANT CHANGE UP (ref 0.5–1.3)
EOSINOPHIL # BLD AUTO: 0 K/UL — SIGNIFICANT CHANGE UP (ref 0–0.5)
EOSINOPHIL NFR BLD AUTO: 0 % — SIGNIFICANT CHANGE UP (ref 0–6)
GBM IGG SER-ACNC: 3 — SIGNIFICANT CHANGE UP (ref 0–20)
GLUCOSE BLDC GLUCOMTR-MCNC: 258 MG/DL — HIGH (ref 70–99)
GLUCOSE BLDC GLUCOMTR-MCNC: 298 MG/DL — HIGH (ref 70–99)
GLUCOSE BLDC GLUCOMTR-MCNC: 312 MG/DL — HIGH (ref 70–99)
GLUCOSE BLDC GLUCOMTR-MCNC: 339 MG/DL — HIGH (ref 70–99)
GLUCOSE SERPL-MCNC: 347 MG/DL — HIGH (ref 70–99)
HCT VFR BLD CALC: 43.6 % — SIGNIFICANT CHANGE UP (ref 39–50)
HDLC SERPL-MCNC: 33 MG/DL — LOW
HGB BLD-MCNC: 13.5 G/DL — SIGNIFICANT CHANGE UP (ref 13–17)
LIDOCAIN IGE QN: 3961 U/L — HIGH (ref 73–393)
LIPID PNL WITH DIRECT LDL SERPL: 133 MG/DL — HIGH
LYMPHOCYTES # BLD AUTO: 29 % — SIGNIFICANT CHANGE UP (ref 13–44)
LYMPHOCYTES # BLD AUTO: 3.29 K/UL — SIGNIFICANT CHANGE UP (ref 1–3.3)
MAGNESIUM SERPL-MCNC: 2.4 MG/DL — SIGNIFICANT CHANGE UP (ref 1.6–2.6)
MANUAL SMEAR VERIFICATION: SIGNIFICANT CHANGE UP
MCHC RBC-ENTMCNC: 26.5 PG — LOW (ref 27–34)
MCHC RBC-ENTMCNC: 31 GM/DL — LOW (ref 32–36)
MCV RBC AUTO: 85.5 FL — SIGNIFICANT CHANGE UP (ref 80–100)
METAMYELOCYTES # FLD: 2 % — HIGH (ref 0–0)
MONOCYTES # BLD AUTO: 1.02 K/UL — HIGH (ref 0–0.9)
MONOCYTES NFR BLD AUTO: 9 % — SIGNIFICANT CHANGE UP (ref 2–14)
NEUTROPHILS # BLD AUTO: 6.8 K/UL — SIGNIFICANT CHANGE UP (ref 1.8–7.4)
NEUTROPHILS NFR BLD AUTO: 60 % — SIGNIFICANT CHANGE UP (ref 43–77)
NON HDL CHOLESTEROL: 189 MG/DL — HIGH
NRBC # BLD: 0 /100 — SIGNIFICANT CHANGE UP (ref 0–0)
NRBC # BLD: SIGNIFICANT CHANGE UP /100 WBCS (ref 0–0)
PHOSPHATE SERPL-MCNC: 2.9 MG/DL — SIGNIFICANT CHANGE UP (ref 2.5–4.5)
PLAT MORPH BLD: NORMAL — SIGNIFICANT CHANGE UP
PLATELET # BLD AUTO: 176 K/UL — SIGNIFICANT CHANGE UP (ref 150–400)
POTASSIUM SERPL-MCNC: 5.6 MMOL/L — HIGH (ref 3.5–5.3)
POTASSIUM SERPL-SCNC: 5.6 MMOL/L — HIGH (ref 3.5–5.3)
RBC # BLD: 5.1 M/UL — SIGNIFICANT CHANGE UP (ref 4.2–5.8)
RBC # FLD: 13.8 % — SIGNIFICANT CHANGE UP (ref 10.3–14.5)
RBC BLD AUTO: SIGNIFICANT CHANGE UP
SODIUM SERPL-SCNC: 141 MMOL/L — SIGNIFICANT CHANGE UP (ref 135–145)
TRIGL SERPL-MCNC: 280 MG/DL — HIGH
WBC # BLD: 11.33 K/UL — HIGH (ref 3.8–10.5)
WBC # FLD AUTO: 11.33 K/UL — HIGH (ref 3.8–10.5)

## 2021-03-23 PROCEDURE — 99233 SBSQ HOSP IP/OBS HIGH 50: CPT

## 2021-03-23 RX ORDER — CEPHALEXIN 500 MG
500 CAPSULE ORAL EVERY 6 HOURS
Refills: 0 | Status: COMPLETED | OUTPATIENT
Start: 2021-03-23 | End: 2021-03-26

## 2021-03-23 RX ORDER — SODIUM CHLORIDE 9 MG/ML
1000 INJECTION, SOLUTION INTRAVENOUS
Refills: 0 | Status: COMPLETED | OUTPATIENT
Start: 2021-03-23 | End: 2021-03-24

## 2021-03-23 RX ADMIN — SODIUM CHLORIDE 125 MILLILITER(S): 9 INJECTION, SOLUTION INTRAVENOUS at 22:28

## 2021-03-23 RX ADMIN — CEFEPIME 100 MILLIGRAM(S): 1 INJECTION, POWDER, FOR SOLUTION INTRAMUSCULAR; INTRAVENOUS at 11:36

## 2021-03-23 RX ADMIN — Medication 3: at 16:17

## 2021-03-23 RX ADMIN — PANTOPRAZOLE SODIUM 40 MILLIGRAM(S): 20 TABLET, DELAYED RELEASE ORAL at 11:36

## 2021-03-23 RX ADMIN — NYSTATIN CREAM 1 APPLICATION(S): 100000 CREAM TOPICAL at 17:14

## 2021-03-23 RX ADMIN — Medication 3: at 11:35

## 2021-03-23 RX ADMIN — INSULIN GLARGINE 20 UNIT(S): 100 INJECTION, SOLUTION SUBCUTANEOUS at 08:23

## 2021-03-23 RX ADMIN — Medication 500 MILLIGRAM(S): at 23:41

## 2021-03-23 RX ADMIN — Medication 4: at 21:37

## 2021-03-23 RX ADMIN — Medication 4: at 08:23

## 2021-03-23 RX ADMIN — Medication 5 UNIT(S): at 11:35

## 2021-03-23 RX ADMIN — Medication 5 UNIT(S): at 16:17

## 2021-03-23 RX ADMIN — HEPARIN SODIUM 5000 UNIT(S): 5000 INJECTION INTRAVENOUS; SUBCUTANEOUS at 21:38

## 2021-03-23 RX ADMIN — Medication 5 UNIT(S): at 08:23

## 2021-03-23 RX ADMIN — HEPARIN SODIUM 5000 UNIT(S): 5000 INJECTION INTRAVENOUS; SUBCUTANEOUS at 06:04

## 2021-03-23 RX ADMIN — SENNA PLUS 2 TABLET(S): 8.6 TABLET ORAL at 21:37

## 2021-03-23 RX ADMIN — NYSTATIN CREAM 1 APPLICATION(S): 100000 CREAM TOPICAL at 06:04

## 2021-03-23 RX ADMIN — HEPARIN SODIUM 5000 UNIT(S): 5000 INJECTION INTRAVENOUS; SUBCUTANEOUS at 13:21

## 2021-03-23 RX ADMIN — Medication 500 MILLIGRAM(S): at 17:15

## 2021-03-23 NOTE — DIETITIAN INITIAL EVALUATION ADULT. - PERTINENT MEDS FT
MEDICATIONS  (STANDING):  cefepime   IVPB      cefepime   IVPB 2000 milliGRAM(s) IV Intermittent every 24 hours  dextrose 40% Gel 15 Gram(s) Oral once  dextrose 5%. 1000 milliLiter(s) (50 mL/Hr) IV Continuous <Continuous>  dextrose 5%. 1000 milliLiter(s) (100 mL/Hr) IV Continuous <Continuous>  dextrose 50% Injectable 25 Gram(s) IV Push once  dextrose 50% Injectable 12.5 Gram(s) IV Push once  dextrose 50% Injectable 25 Gram(s) IV Push once  glucagon  Injectable 1 milliGRAM(s) IntraMuscular once  heparin   Injectable 5000 Unit(s) SubCutaneous every 8 hours  insulin glargine Injectable (LANTUS) 20 Unit(s) SubCutaneous every morning  insulin lispro (ADMELOG) corrective regimen sliding scale   SubCutaneous three times a day before meals  insulin lispro (ADMELOG) corrective regimen sliding scale   SubCutaneous at bedtime  insulin lispro Injectable (ADMELOG) 5 Unit(s) SubCutaneous before dinner  insulin lispro Injectable (ADMELOG) 5 Unit(s) SubCutaneous before breakfast  insulin lispro Injectable (ADMELOG) 5 Unit(s) SubCutaneous before lunch  nystatin Cream 1 Application(s) Topical two times a day  pantoprazole  Injectable 40 milliGRAM(s) IV Push daily  senna 2 Tablet(s) Oral at bedtime  sodium chloride 0.45%. 1000 milliLiter(s) (75 mL/Hr) IV Continuous <Continuous>    MEDICATIONS  (PRN):  acetaminophen    Suspension .. 650 milliGRAM(s) Oral every 6 hours PRN Temp greater or equal to 38C (100.4F)

## 2021-03-23 NOTE — CONSULT NOTE ADULT - ASSESSMENT
DKA r/o underlying precipitating  infection   newly dx DM
b/l toe numbness starting 3/23 not ascending no back pain/neck pain likely 2/2 newly diagnosed DM    Plan  MRI Brain w/o rule out midline structure  MRI L spine  will continue to follow

## 2021-03-23 NOTE — PROGRESS NOTE ADULT - SUBJECTIVE AND OBJECTIVE BOX
Patient is a 25y old  Male who presents with a chief complaint of     Interval History: finger sticks aroeund 300 , suspect type 2 diabetes with increased Insulin Resistance   on Lantus 20 units and prandial lispro 5 units     MEDICATIONS  (STANDING):  cephalexin 500 milliGRAM(s) Oral every 6 hours  dextrose 40% Gel 15 Gram(s) Oral once  dextrose 5%. 1000 milliLiter(s) (50 mL/Hr) IV Continuous <Continuous>  dextrose 5%. 1000 milliLiter(s) (100 mL/Hr) IV Continuous <Continuous>  dextrose 50% Injectable 25 Gram(s) IV Push once  dextrose 50% Injectable 12.5 Gram(s) IV Push once  dextrose 50% Injectable 25 Gram(s) IV Push once  glucagon  Injectable 1 milliGRAM(s) IntraMuscular once  heparin   Injectable 5000 Unit(s) SubCutaneous every 8 hours  insulin glargine Injectable (LANTUS) 20 Unit(s) SubCutaneous every morning  insulin lispro (ADMELOG) corrective regimen sliding scale   SubCutaneous three times a day before meals  insulin lispro (ADMELOG) corrective regimen sliding scale   SubCutaneous at bedtime  insulin lispro Injectable (ADMELOG) 5 Unit(s) SubCutaneous before dinner  insulin lispro Injectable (ADMELOG) 5 Unit(s) SubCutaneous before breakfast  insulin lispro Injectable (ADMELOG) 5 Unit(s) SubCutaneous before lunch  nystatin Cream 1 Application(s) Topical two times a day  pantoprazole  Injectable 40 milliGRAM(s) IV Push daily  senna 2 Tablet(s) Oral at bedtime    MEDICATIONS  (PRN):  acetaminophen    Suspension .. 650 milliGRAM(s) Oral every 6 hours PRN Temp greater or equal to 38C (100.4F)      Allergies    No Known Allergies    Intolerances        REVIEW OF SYSTEMS:  CONSTITUTIONAL: no changes  EYES: No eye pain, visual disturbances, or discharge  ENMT:  No difficulty hearing, No sinus or throat pain  NECK: No pain or stiffness  RESPIRATORY: No cough, wheezing, chills or hemoptysis; No shortness of breath  CARDIOVASCULAR: No chest pain, palpitations or leg swelling  GASTROINTESTINAL: No abdominal or epigastric pain. No nausea, vomiting, or hematemesis; No diarrhea or constipation. No melena or hematochezia.  GENITOURINARY: No dysuria, frequency, hematuria, or incontinence  NEUROLOGICAL: No headaches, memory loss, loss of strength, numbness, or tremors  SKIN: No itching, burning, rashes, or lesions   ENDOCRINE: No heat or cold intolerance; No hair loss  MUSCULOSKELETAL: No joint pain or swelling; No muscle, back, or extremity pain  PSYCHIATRIC: No depression, anxiety, mood swings, or difficulty sleeping  HEME/LYMPH: No easy bruising, or bleeding gums  ALLERY AND IMMUNOLOGIC: No hives or eczema    Vital Signs Last 24 Hrs  T(C): 37.1 (23 Mar 2021 11:42), Max: 37.2 (23 Mar 2021 05:38)  T(F): 98.7 (23 Mar 2021 11:42), Max: 99 (23 Mar 2021 05:38)  HR: 103 (23 Mar 2021 11:42) (103 - 112)  BP: 150/81 (23 Mar 2021 11:42) (150/81 - 161/85)  BP(mean): --  RR: 18 (23 Mar 2021 11:42) (18 - 18)  SpO2: 94% (23 Mar 2021 11:42) (94% - 98%)    PHYSICAL EXAM:  GENERAL:   HEAD: Atraumatic, Normocephalic  EYES: PERRLA, conjunctiva and sclera clear  ENMT: No tonsillar erythema, exudates, or enlargement; Moist mucous membranes, Good dentition, No lesions  NECK: Supple, No JVD, Normal thyroid  NERVOUS SYSTEM:  Alert & Oriented X3, Good concentration; Motor Strength 5/5 B/L upper and lower extremities  CHEST/LUNG: Clear to auscultaion bilaterally; No rales, rhonchi, wheezing, or rubs  HEART: Regular rate and rhythm; No murmurs, rubs, or gallops  ABDOMEN: Soft, Nontender, Nondistended; Bowel sounds present  EXTREMITIES:  2+ Peripheral Pulses, no edema  SKIN: No rashes or lesions    LABS:        CAPILLARY BLOOD GLUCOSE      POCT Blood Glucose.: 258 mg/dL (23 Mar 2021 15:26)  POCT Blood Glucose.: 298 mg/dL (23 Mar 2021 11:04)  POCT Blood Glucose.: 312 mg/dL (23 Mar 2021 07:42)  POCT Blood Glucose.: 332 mg/dL (22 Mar 2021 21:48)    Lipid panel:           Thyroid:  Diabetes Tests:  Parathyroid Panel:  Adrenals:  RADIOLOGY & ADDITIONAL TESTS:    Imaging Personally Reviewed:  [ ] YES  [ ] NO    Consultant(s) Notes Reviewed:  [ ] YES  [ ] NO    Care Discussed with Consultants/Other Providers [ ] YES  [ ] NO

## 2021-03-23 NOTE — PROGRESS NOTE ADULT - ASSESSMENT
26yo M with no known PMH presented for lethargy and altered mental status, found to have Glucose 1374, Cr 5.2 and , anion gap 29.  Admitted for DKA, severe dehydration, acute renal insufficiency, uremia, acute metabolic encephalopathy, sepsis with septic shock.  Course complicated by acute hypoxic respiratory failure requiring intubation 3/19. Extubated 3/20.  Transferred to hospitalist service 3/22.      # DKA - New onset DM - AGAP has normalized.  Discussed need for insulin and diabetic teaching.  Endocrine following.  Continue Admelog, Lantus.  Monitor FS.  # B/l foot numbness - ? neuropathy.  motor function intact.  Neurology input requested.   # ASHLEE / Dehydration / Hypernatremia/ Hyperkalemia - Cr has significantly improved with IVF.  Monitor K.  Renal followup.  Repeat BMP in am.   # Uremia, Metabolic Encephalopathy - improved.    # S/p Acute Hypoxic Respiratory Failure - due to above, improved.  # Fever, s/p septic shock - attributed to pneumonia.  Initial CXR showed no infiltrate.  Fevers have improved.  WBC wnl.  Will deescalate to Keflex and monitor.  If fever recurs, would consider further ID input.  # DVT Prophylaxis - Heparin subcut

## 2021-03-23 NOTE — PROGRESS NOTE ADULT - SUBJECTIVE AND OBJECTIVE BOX
[facility] NEPHROLOGY SERVICES, [facility]  NEPHROLOGY AND HYPERTENSION  [address]  [address]  [place] [address]  [phone]    MD XOCHILT LOPEZ MD ANDREY GONCHARUK, MD MADHU KORRAPATI, MD YELENA ROSENBERG, MD BINNY KOSHY, MD CHRISTOPHER CAPUTO, MD EDWARD BOVER, MD          Patient events noted    MEDICATIONS  (STANDING):  cephalexin 500 milliGRAM(s) Oral every 6 hours  dextrose 40% Gel 15 Gram(s) Oral once  dextrose 5%. 1000 milliLiter(s) (50 mL/Hr) IV Continuous <Continuous>  dextrose 5%. 1000 milliLiter(s) (100 mL/Hr) IV Continuous <Continuous>  dextrose 50% Injectable 25 Gram(s) IV Push once  dextrose 50% Injectable 12.5 Gram(s) IV Push once  dextrose 50% Injectable 25 Gram(s) IV Push once  glucagon  Injectable 1 milliGRAM(s) IntraMuscular once  heparin   Injectable 5000 Unit(s) SubCutaneous every 8 hours  insulin glargine Injectable (LANTUS) 20 Unit(s) SubCutaneous every morning  insulin lispro (ADMELOG) corrective regimen sliding scale   SubCutaneous three times a day before meals  insulin lispro (ADMELOG) corrective regimen sliding scale   SubCutaneous at bedtime  insulin lispro Injectable (ADMELOG) 5 Unit(s) SubCutaneous before breakfast  insulin lispro Injectable (ADMELOG) 5 Unit(s) SubCutaneous before lunch  insulin lispro Injectable (ADMELOG) 5 Unit(s) SubCutaneous before dinner  nystatin Cream 1 Application(s) Topical two times a day  pantoprazole  Injectable 40 milliGRAM(s) IV Push daily  senna 2 Tablet(s) Oral at bedtime    MEDICATIONS  (PRN):  acetaminophen    Suspension .. 650 milliGRAM(s) Oral every 6 hours PRN Temp greater or equal to 38C (100.4F)      03-22-21 @ 07:01  -  03-23-21 @ 07:00  --------------------------------------------------------  IN: 2093 mL / OUT: 1550 mL / NET: 543 mL    03-23-21 @ 07:01  -  03-23-21 @ 20:51  --------------------------------------------------------  IN: 0 mL / OUT: 1000 mL / NET: -1000 mL      PHYSICAL EXAM:      T(C): 37.1 (03-23-21 @ 11:42), Max: 37.2 (03-23-21 @ 05:38)  HR: 103 (03-23-21 @ 11:42) (103 - 112)  BP: 150/81 (03-23-21 @ 11:42) (150/81 - 161/85)  RR: 18 (03-23-21 @ 11:42) (18 - 18)  SpO2: 94% (03-23-21 @ 11:42) (94% - 98%)  Wt(kg): --  Lungs clear  Heart S1S2  Abd soft NT ND  Extremities:   tr edema                                    13.5   11.33 )-----------( 176      ( 23 Mar 2021 10:57 )             43.6     03-23    141  |  110<H>  |  30<H>  ----------------------------<  347<H>  5.6<H>   |  18<L>  |  1.28    Ca    9.3      23 Mar 2021 10:57  Phos  2.9     03-23  Mg     2.4     03-23    TPro  7.3  /  Alb  2.9<L>  /  TBili  0.7  /  DBili  .12  /  AST  98<H>  /  ALT  59  /  AlkPhos  79  03-22      LIVER FUNCTIONS - ( 22 Mar 2021 04:33 )  Alb: 2.9 g/dL / Pro: 7.3 gm/dL / ALK PHOS: 79 U/L / ALT: 59 U/L / AST: 98 U/L / GGT: x           Creatinine Trend: 1.28<--, 1.44<--, 1.53<--, 1.89<--, 3.11<--, 4.48<--        Assessment     ASHLEE hypernatremia DKA, pre renal azotemia   Hyperkalemia related to hyperglycemia; increasing AG    Plan:  IVF maintenance   Follow ketones   IV insulin   Will follow.     Eddy Bello MD [facility] NEPHROLOGY SERVICES, [facility]  NEPHROLOGY AND HYPERTENSION  [address]  [address]  [place] [address]  [phone]    MD XOCHILT LOPEZ MD ANDREY GONCHARUK, MD MADHU KORRAPATI, MD YELENA ROSENBERG, MD BINNY KOSHY, MD CHRISTOPHER CAPUTO, MD EDWARD BOVER, MD          Patient events noted    MEDICATIONS  (STANDING):  cephalexin 500 milliGRAM(s) Oral every 6 hours  dextrose 40% Gel 15 Gram(s) Oral once  dextrose 5%. 1000 milliLiter(s) (50 mL/Hr) IV Continuous <Continuous>  dextrose 5%. 1000 milliLiter(s) (100 mL/Hr) IV Continuous <Continuous>  dextrose 50% Injectable 25 Gram(s) IV Push once  dextrose 50% Injectable 12.5 Gram(s) IV Push once  dextrose 50% Injectable 25 Gram(s) IV Push once  glucagon  Injectable 1 milliGRAM(s) IntraMuscular once  heparin   Injectable 5000 Unit(s) SubCutaneous every 8 hours  insulin glargine Injectable (LANTUS) 20 Unit(s) SubCutaneous every morning  insulin lispro (ADMELOG) corrective regimen sliding scale   SubCutaneous three times a day before meals  insulin lispro (ADMELOG) corrective regimen sliding scale   SubCutaneous at bedtime  insulin lispro Injectable (ADMELOG) 5 Unit(s) SubCutaneous before breakfast  insulin lispro Injectable (ADMELOG) 5 Unit(s) SubCutaneous before lunch  insulin lispro Injectable (ADMELOG) 5 Unit(s) SubCutaneous before dinner  nystatin Cream 1 Application(s) Topical two times a day  pantoprazole  Injectable 40 milliGRAM(s) IV Push daily  senna 2 Tablet(s) Oral at bedtime    MEDICATIONS  (PRN):  acetaminophen    Suspension .. 650 milliGRAM(s) Oral every 6 hours PRN Temp greater or equal to 38C (100.4F)      03-22-21 @ 07:01  -  03-23-21 @ 07:00  --------------------------------------------------------  IN: 2093 mL / OUT: 1550 mL / NET: 543 mL    03-23-21 @ 07:01  -  03-23-21 @ 20:51  --------------------------------------------------------  IN: 0 mL / OUT: 1000 mL / NET: -1000 mL      PHYSICAL EXAM:      T(C): 37.1 (03-23-21 @ 11:42), Max: 37.2 (03-23-21 @ 05:38)  HR: 103 (03-23-21 @ 11:42) (103 - 112)  BP: 150/81 (03-23-21 @ 11:42) (150/81 - 161/85)  RR: 18 (03-23-21 @ 11:42) (18 - 18)  SpO2: 94% (03-23-21 @ 11:42) (94% - 98%)  Wt(kg): --  Lungs clear  Heart S1S2  Abd soft NT ND  Extremities:   tr edema                                    13.5   11.33 )-----------( 176      ( 23 Mar 2021 10:57 )             43.6     03-23    141  |  110<H>  |  30<H>  ----------------------------<  347<H>  5.6<H>   |  18<L>  |  1.28    Ca    9.3      23 Mar 2021 10:57  Phos  2.9     03-23  Mg     2.4     03-23    TPro  7.3  /  Alb  2.9<L>  /  TBili  0.7  /  DBili  .12  /  AST  98<H>  /  ALT  59  /  AlkPhos  79  03-22      LIVER FUNCTIONS - ( 22 Mar 2021 04:33 )  Alb: 2.9 g/dL / Pro: 7.3 gm/dL / ALK PHOS: 79 U/L / ALT: 59 U/L / AST: 98 U/L / GGT: x           Creatinine Trend: 1.28<--, 1.44<--, 1.53<--, 1.89<--, 3.11<--, 4.48<--        Assessment     ASHLEE hypernatremia DKA, pre renal azotemia   Hyperkalemia related to hyperglycemia; increasing AG  Pancreatitis    Plan:  IVF maintenance   Follow ketones   IV insulin   Will follow.     Eddy Bello MD

## 2021-03-23 NOTE — DIETITIAN INITIAL EVALUATION ADULT. - PERTINENT LABORATORY DATA
03-22 Na149 mmol/L<H> Glu 314 mg/dL<H> K+ 4.2 mmol/L Cr  1.44 mg/dL<H> BUN 22 mg/dL 03-22 Phos 2.5 mg/dL 03-22 Alb 2.9 g/dL<L> 03-22 Chol 196 mg/dL LDL --    HDL 41 mg/dL Trig 173 mg/dL<H>    03-19-21 @ 14:54A1C 12.5  POCT Glucose (3/23) 312, (3/22) 332, 321, 414, 304, 283, 256.

## 2021-03-23 NOTE — DIETITIAN INITIAL EVALUATION ADULT. - OTHER INFO
Pt adm w/lethargy & AMS, found to have Glucose of 1374 mg/dl in ED. Per chart pt w/DKA, severe dehydration, Acute Renal Insufficiency, Metabolic Encephalopathy, Sepsis w/septic shock. Course C/B Acute Respiratory Failure requiring intubation (3/19); extubated (3/20). New onset DM. Endocrinology following, per chart- DKA resolved. Per chart COVID-19 "ruled in" despite neg. lab. Met w/pt at bedside, pt reports improved appetite & PO intake, states appetite has been diminished for few days PTA, but now better & he feels hungry at night. Pt denies N/V/D/C or difficulty chewing/swallowing. Pt reports his mother & he do the food shopping/cooking together. Pt does not follow any specific diets. Pt denies any food allergies but states he avoids Dairy as some things do not agree w/him. Same communicated to the kitchen. Pt reports UBW of ~215-220# and states his mom thinks he has lost a little weight. Wt loss as stated below. Comprehensive diet education provided on Diabetes & Nutrition and Meal Planning w/Plate Method. Discussed CHO sources of foods, CHO portion sizes, BG goals, & Low BG management. Encouraged pt to check FS & follow w/endocrinologist upon D/C. Pt asked many pertinent questions; all questions answered to pt's satisfaction. Pt made aware RD remains available.

## 2021-03-23 NOTE — PROGRESS NOTE ADULT - PROBLEM SELECTOR PLAN 1
earlier with mental status changes   now better   Check C-Peptide and if sufficient , Insulin sensitizers can be added

## 2021-03-23 NOTE — CONSULT NOTE ADULT - SUBJECTIVE AND OBJECTIVE BOX
26yo M with no known PMH presented for lethargy and altered mental status, found to have Glucose 1374, Cr 5.2 and , anion gap 29.  Admitted for DKA, severe dehydration, acute renal insufficiency, uremia, acute metabolic encephalopathy, sepsis with septic shock.  Course complicated by acute hypoxic respiratory failure requiring intubation 3/19. Extubated 3/20.  Transferred to hospitalist service 3/22.  He today complains of numbness in all toes, not ascending no weakness. He He denies neck or back pain. He said it is resolved. 
[facility] NEPHROLOGY SERVICES, [facility]  NEPHROLOGY AND HYPERTENSION  [address]  [address]  [place] [address]  [phone]    MD XOCHILT LOPEZ MD ANDREY GONCHARUK, MD MADHU KORRAPATI, MD YELENA ROSENBERG, MD BINNY KOSHY, MD CHRISTOPHER CAPUTO, MD LIZZETH CRUZ MD      Information from chart:  "Patient is a 25y old  Male who presents with a chief complaint of   HPI:   25M with no know medical history  p/f AMS, per family pt been acting more fatigued last several days, became worse today, seemed lethargic so called EMS. Found to be hyperglycemic to 300s by EMS, IN ED patients Blood glucose 1374.   IN ED patient was agitated received haldol/ ativan X1 (19 Mar 2021 01:08)   "    Events noted, became unresponsive;   Intubated  Cr trend noted in the face of DKA and labile MAP    Trend Bun/Cr  21 @ 17:03  BUN/CR -  100<H> / 4.33<H>  21 @ 10:59  BUN/CR -  96<H> / 3.87<H>  21 @ 05:36  BUN/CR -  104<H> / 4.86<H>  21 @ 00:24  BUN/CR -  65<H> / 2.71<H>  21 @ 22:37  BUN/CR -  104<H> / 5.23<H>        PAST MEDICAL & SURGICAL HISTORY:  No pertinent past medical history    No significant past surgical history      FAMILY HISTORY:  No pertinent family history in first degree relatives      Allergies    No Known Allergies    Intolerances      Home Medications:    MEDICATIONS  (STANDING):  cefepime   IVPB      chlorhexidine 0.12% Liquid 15 milliLiter(s) Oral Mucosa every 12 hours  chlorhexidine 2% Cloths 1 Application(s) Topical daily  dexMEDEtomidine Infusion 0.2 MICROgram(s)/kG/Hr (5.9 mL/Hr) IV Continuous <Continuous>  heparin   Injectable 5000 Unit(s) SubCutaneous every 8 hours  insulin regular Infusion 10 Unit(s)/Hr (10 mL/Hr) IV Continuous <Continuous>  pantoprazole  Injectable 40 milliGRAM(s) IV Push daily  propofol Infusion 20 MICROgram(s)/kG/Min (14.1 mL/Hr) IV Continuous <Continuous>  sodium chloride 0.45%. 1000 milliLiter(s) (150 mL/Hr) IV Continuous <Continuous>    MEDICATIONS  (PRN):    Vital Signs Last 24 Hrs  T(C): 38.4 (19 Mar 2021 19:06), Max: 38.4 (19 Mar 2021 19:06)  T(F): 101.1 (19 Mar 2021 19:06), Max: 101.1 (19 Mar 2021 19:06)  HR: 92 (19 Mar 2021 19:00) (91 - 114)  BP: 111/65 (19 Mar 2021 19:00) (90/54 - 206/102)  BP(mean): 77 (19 Mar 2021 19:00) (66 - 123)  RR: 16 (19 Mar 2021 19:00) (13 - 22)  SpO2: 92% (19 Mar 2021 19:00) (92% - 100%)  Mode: AC/ CMV (Assist Control/ Continuous Mandatory Ventilation)  RR (machine): 14  TV (machine): 480  FiO2: 80  PEEP: 8  ITime: 1  MAP: 9  PIP: 15    Daily Height in cm: 177.8 (19 Mar 2021 16:00)    Daily     21 @ 07:  -  21 @ 07:00  --------------------------------------------------------  IN: 542 mL / OUT: 2500 mL / NET: -1958 mL    21 @ 07:01  21 @ 19:35  --------------------------------------------------------  IN: 2952.9 mL / OUT: 1420 mL / NET: 1532.9 mL      CAPILLARY BLOOD GLUCOSE  461 (19 Mar 2021 14:00)  287 (19 Mar 2021 13:00)  292 (19 Mar 2021 12:00)  342 (19 Mar 2021 11:00)  358 (19 Mar 2021 10:00)  446 (19 Mar 2021 09:00)  489 (19 Mar 2021 08:00)      POCT Blood Glucose.: 191 mg/dL (19 Mar 2021 18:51)  POCT Blood Glucose.: 263 mg/dL (19 Mar 2021 18:04)  POCT Blood Glucose.: 224 mg/dL (19 Mar 2021 17:47)  POCT Blood Glucose.: 224 mg/dL (19 Mar 2021 16:59)  POCT Blood Glucose.: 238 mg/dL (19 Mar 2021 16:00)  POCT Blood Glucose.: 294 mg/dL (19 Mar 2021 15:00)  POCT Blood Glucose.: 461 mg/dL (19 Mar 2021 14:14)  POCT Blood Glucose.: 287 mg/dL (19 Mar 2021 12:54)  POCT Blood Glucose.: 292 mg/dL (19 Mar 2021 11:58)  POCT Blood Glucose.: 342 mg/dL (19 Mar 2021 10:56)  POCT Blood Glucose.: 358 mg/dL (19 Mar 2021 10:08)  POCT Blood Glucose.: 443 mg/dL (19 Mar 2021 08:58)  POCT Blood Glucose.: 489 mg/dL (19 Mar 2021 08:01)  POCT Blood Glucose.: >600 mg/dL (19 Mar 2021 06:48)  POCT Blood Glucose.: >600 mg/dL (19 Mar 2021 05:00)  POCT Blood Glucose.: >600 mg/dL (19 Mar 2021 03:43)  POCT Blood Glucose.: >600 mg/dL (19 Mar 2021 02:22)  POCT Blood Glucose.: >600 mg/dL (19 Mar 2021 01:18)  POCT Blood Glucose.: >600 mg/dL (19 Mar 2021 01:17)  POCT Blood Glucose.: >600 mg/dL (18 Mar 2021 21:30)  POCT Blood Glucose.: >600 mg/dL (18 Mar 2021 21:26)    PHYSICAL EXAM:      T(C): 38.4 (21 @ 19:06), Max: 38.4 (21 @ 19:06)  HR: 92 (21 @ 19:00) (91 - 114)  BP: 111/65 (21 @ 19:00) (90/54 - 206/102)  RR: 16 (21 @ 19:00) (13 - 22)  SpO2: 92% (21 @ 19:00) (92% - 100%)  Wt(kg): --  Lungs clear  Heart S1S2  Abd soft NT ND  Extremities:   tr edema                  155<H>  |  123<H>  |  100<H>  ----------------------------<  261<H>  5.1   |  26  |  4.33<H>    Ca    9.0      19 Mar 2021 17:03  Phos  2.1       Mg     5.2         TPro  8.5<H>  /  Alb  4.2  /  TBili  0.5  /  DBili  x   /  AST  10<L>  /  ALT  29  /  AlkPhos  102                            16.5   16.68 )-----------( 193      ( 19 Mar 2021 10:59 )             50.1     Creatinine Trend: 4.33<--, 3.87<--, 4.86<--, 2.71<--, 5.23<--  Urinalysis Basic - ( 19 Mar 2021 01:11 )    Color: Yellow / Appearance: Clear / S.015 / pH: x  Gluc: x / Ketone: Moderate  / Bili: Negative / Urobili: Negative mg/dL   Blood: x / Protein: 30 mg/dL / Nitrite: Negative   Leuk Esterase: Negative / RBC: 3-5 /HPF / WBC 3-5   Sq Epi: x / Non Sq Epi: Occasional / Bacteria: Few      ABG - ( 19 Mar 2021 17:31 )  pH, Arterial: x     pH, Blood: 7.36  /  pCO2: 44    /  pO2: 181   / HCO3: 24    / Base Excess: -1.1  /  SaO2: 99                    Assessment   ASHLEE, DKA, hypernatremia; pre renal azotemia; acute HTN injury;   R/O occult secondary GN, CTD/ vasculitic related, pulm renal disease    Plan  Agree with hypotonic IVF  UA micro  CPK  Serologies;  Renal sono once stable    Eddy Bello MD
  Patient is a 25y old  Male who presents with a chief complaint of     HPI:   25M with no know medical history  p/f AMS, per family pt been acting more fatigued last several days, became worse today, seemed lethargic so called EMS. Found to be hyperglycemic to 300s by EMS, IN ED patients Blood glucose 1374.   IN ED patient was agitated received haldol/ ativan X1 (19 Mar 2021 01:08)  endocrine called for further evaluation  pt lethargic (previously combative) unable to provide hx  currently on IV insulin drip and  IV hydration fsbg slowly improving, remains acidotic      PAST MEDICAL & SURGICAL HISTORY:  No pertinent past medical history    No significant past surgical history      FAMILY HISTORY:  No pertinent family history in first degree relatives          Social History:    Allergies    No Known Allergies    Intolerances        MEDICATIONS  (STANDING):  cefepime   IVPB 2000 milliGRAM(s) IV Intermittent once  cefepime   IVPB      chlorhexidine 4% Liquid 1 Application(s) Topical <User Schedule>  dexMEDEtomidine Infusion 0.2 MICROgram(s)/kG/Hr (5.9 mL/Hr) IV Continuous <Continuous>  heparin   Injectable 5000 Unit(s) SubCutaneous every 8 hours  insulin regular Infusion 10 Unit(s)/Hr (10 mL/Hr) IV Continuous <Continuous>  lactated ringers. 1000 milliLiter(s) (125 mL/Hr) IV Continuous <Continuous>  vancomycin  IVPB 1500 milliGRAM(s) IV Intermittent once    MEDICATIONS  (PRN):  hydrALAZINE Injectable 10 milliGRAM(s) IV Push every 6 hours PRN SBP > 180      REVIEW OF SYSTEMS:  unobtainable    T(C): 36.7 (03-19-21 @ 08:00), Max: 36.9 (03-19-21 @ 04:25)  HR: 109 (03-19-21 @ 10:00) (106 - 114)  BP: 158/80 (03-19-21 @ 10:00) (139/94 - 206/102)  RR: 21 (03-19-21 @ 10:00) (13 - 22)  SpO2: 100% (03-19-21 @ 10:00) (98% - 100%)  Wt(kg): --    PHYSICAL EXAM:  GENERAL: NAD, well-groomed, well-developed lthargic  HEAD:  Atraumatic, Normocephalic  EYES:  conjunctiva and sclera clear  CHEST/LUNG: Clear to percussion bilaterally; No rales, rhonchi, wheezing, or rubs  HEART: Regular rate and rhythm; No murmurs, rubs, or gallops  ABDOMEN: Soft, Nontender, Nondistended; Bowel sounds present      CAPILLARY BLOOD GLUCOSE  358 (19 Mar 2021 10:00)  446 (19 Mar 2021 09:00)  489 (19 Mar 2021 08:00)      POCT Blood Glucose.: 342 mg/dL (19 Mar 2021 10:56)  POCT Blood Glucose.: 358 mg/dL (19 Mar 2021 10:08)  POCT Blood Glucose.: 443 mg/dL (19 Mar 2021 08:58)  POCT Blood Glucose.: 489 mg/dL (19 Mar 2021 08:01)  POCT Blood Glucose.: >600 mg/dL (19 Mar 2021 06:48)  POCT Blood Glucose.: >600 mg/dL (19 Mar 2021 05:00)  POCT Blood Glucose.: >600 mg/dL (19 Mar 2021 03:43)  POCT Blood Glucose.: >600 mg/dL (19 Mar 2021 02:22)  POCT Blood Glucose.: >600 mg/dL (19 Mar 2021 01:18)  POCT Blood Glucose.: >600 mg/dL (19 Mar 2021 01:17)  POCT Blood Glucose.: >600 mg/dL (18 Mar 2021 21:30)  POCT Blood Glucose.: >600 mg/dL (18 Mar 2021 21:26)                            16.5   16.68 )-----------( 193      ( 19 Mar 2021 10:59 )             50.1       CMP:  03-19 @ 05:36  SGPT --  Albumin --   Alk Phos --   Anion Gap 22   SGOT --   Total Bili --      Calcium Total 9.2   CO2 18   Chloride 108   Creatinine 4.86   eGFR if AA 18   eGFR if non AA 15   Glucose 749   Potassium 5.7   Protein --   Sodium 148      Thyroid Function Tests:  03-18 @ 22:37 TSH 0.599 FreeT4 -- T3 -- Anti TPO -- Anti Thyroglobulin Ab -- TSI --      Diabetes Tests:     Parathyroids:     Adrenals:       Radiology:

## 2021-03-23 NOTE — PROGRESS NOTE ADULT - SUBJECTIVE AND OBJECTIVE BOX
Patient: ADAM MCARTHUR [id number] 25y Male                           Internal Medicine Hospitalist Progress Note      Taking po.  FS better.  C/o b/l toe numbness, symmetric, which began today.  Ambulating.  No weakness.        ____________________PHYSICAL EXAM:  GENERAL:  NAD Alert and Oriented x 3   HEENT: NCAT  CARDIOVASCULAR:  S1, S2  LUNGS: CTAB  ABDOMEN:  soft, (-) tenderness, (-) distension, (+) bowel sounds, (-) guarding, (-) rebound (-) rigidity  EXTREMITIES:  no cyanosis / clubbing / edema. Pulses intact b/l feet.   NEURO: diminished sensation b/l feet.    ____________________    VITALS:  Vital Signs Last 24 Hrs  T(C): 37.1 (23 Mar 2021 11:42), Max: 37.2 (23 Mar 2021 05:38)  T(F): 98.7 (23 Mar 2021 11:42), Max: 99 (23 Mar 2021 05:38)  HR: 103 (23 Mar 2021 11:42) (91 - 112)  BP: 150/81 (23 Mar 2021 11:42) (150/81 - 162/79)  BP(mean): 115 (22 Mar 2021 18:59) (107 - 115)  RR: 18 (23 Mar 2021 11:42) (18 - 30)  SpO2: 94% (23 Mar 2021 11:42) (94% - 98%) Daily     Daily Weight in k.5 (23 Mar 2021 05:38)  CAPILLARY BLOOD GLUCOSE      POCT Blood Glucose.: 298 mg/dL (23 Mar 2021 11:04)  POCT Blood Glucose.: 312 mg/dL (23 Mar 2021 07:42)  POCT Blood Glucose.: 332 mg/dL (22 Mar 2021 21:48)  POCT Blood Glucose.: 321 mg/dL (22 Mar 2021 18:55)  POCT Blood Glucose.: 414 mg/dL (22 Mar 2021 17:20)  POCT Blood Glucose.: 304 mg/dL (22 Mar 2021 15:17)    I&O's Summary    22 Mar 2021 07:01  -  23 Mar 2021 07:00  --------------------------------------------------------  IN: 2093 mL / OUT: 1550 mL / NET: 543 mL    23 Mar 2021 07:01  -  23 Mar 2021 13:48  --------------------------------------------------------  IN: 0 mL / OUT: 400 mL / NET: -400 mL        LABS:                        13.5   11.33 )-----------( 176      ( 23 Mar 2021 10:57 )             43.6         141  |  110<H>  |  30<H>  ----------------------------<  347<H>  5.6<H>   |  18<L>  |  1.28    Ca    9.3      23 Mar 2021 10:57  Phos  2.9       Mg     2.4         TPro  7.3  /  Alb  2.9<L>  /  TBili  0.7  /  DBili  .12  /  AST  98<H>  /  ALT  59  /  AlkPhos  79        LIVER FUNCTIONS - ( 22 Mar 2021 04:33 )  Alb: 2.9 g/dL / Pro: 7.3 gm/dL / ALK PHOS: 79 U/L / ALT: 59 U/L / AST: 98 U/L / GGT: x                     MEDICATIONS:  acetaminophen    Suspension .. 650 milliGRAM(s) Oral every 6 hours PRN  cephalexin 500 milliGRAM(s) Oral every 6 hours  dextrose 40% Gel 15 Gram(s) Oral once  dextrose 5%. 1000 milliLiter(s) IV Continuous <Continuous>  dextrose 5%. 1000 milliLiter(s) IV Continuous <Continuous>  dextrose 50% Injectable 25 Gram(s) IV Push once  dextrose 50% Injectable 12.5 Gram(s) IV Push once  dextrose 50% Injectable 25 Gram(s) IV Push once  glucagon  Injectable 1 milliGRAM(s) IntraMuscular once  heparin   Injectable 5000 Unit(s) SubCutaneous every 8 hours  insulin glargine Injectable (LANTUS) 20 Unit(s) SubCutaneous every morning  insulin lispro (ADMELOG) corrective regimen sliding scale   SubCutaneous three times a day before meals  insulin lispro (ADMELOG) corrective regimen sliding scale   SubCutaneous at bedtime  insulin lispro Injectable (ADMELOG) 5 Unit(s) SubCutaneous before dinner  insulin lispro Injectable (ADMELOG) 5 Unit(s) SubCutaneous before breakfast  insulin lispro Injectable (ADMELOG) 5 Unit(s) SubCutaneous before lunch  nystatin Cream 1 Application(s) Topical two times a day  pantoprazole  Injectable 40 milliGRAM(s) IV Push daily  senna 2 Tablet(s) Oral at bedtime

## 2021-03-24 LAB
AMPHET UR-MCNC: NEGATIVE — SIGNIFICANT CHANGE UP
ANION GAP SERPL CALC-SCNC: 17 MMOL/L — SIGNIFICANT CHANGE UP (ref 5–17)
BARBITURATES, URINE.: NEGATIVE — SIGNIFICANT CHANGE UP
BENZODIAZ UR-MCNC: NEGATIVE — SIGNIFICANT CHANGE UP
BUN SERPL-MCNC: 32 MG/DL — HIGH (ref 7–23)
CALCIUM SERPL-MCNC: 10 MG/DL — SIGNIFICANT CHANGE UP (ref 8.5–10.1)
CHLORIDE SERPL-SCNC: 105 MMOL/L — SIGNIFICANT CHANGE UP (ref 96–108)
CO2 SERPL-SCNC: 17 MMOL/L — LOW (ref 22–31)
COCAINE METAB.OTHER UR-MCNC: NEGATIVE — SIGNIFICANT CHANGE UP
CREAT SERPL-MCNC: 1.25 MG/DL — SIGNIFICANT CHANGE UP (ref 0.5–1.3)
CREATININE, URINE THERAPEUTIC: 83.6 MG/DL — SIGNIFICANT CHANGE UP
CULTURE RESULTS: SIGNIFICANT CHANGE UP
CULTURE RESULTS: SIGNIFICANT CHANGE UP
GLUCOSE BLDC GLUCOMTR-MCNC: 234 MG/DL — HIGH (ref 70–99)
GLUCOSE BLDC GLUCOMTR-MCNC: 244 MG/DL — HIGH (ref 70–99)
GLUCOSE BLDC GLUCOMTR-MCNC: 253 MG/DL — HIGH (ref 70–99)
GLUCOSE BLDC GLUCOMTR-MCNC: 264 MG/DL — HIGH (ref 70–99)
GLUCOSE BLDC GLUCOMTR-MCNC: 280 MG/DL — HIGH (ref 70–99)
GLUCOSE BLDC GLUCOMTR-MCNC: 390 MG/DL — HIGH (ref 70–99)
GLUCOSE SERPL-MCNC: 319 MG/DL — HIGH (ref 70–99)
HCT VFR BLD CALC: 44.6 % — SIGNIFICANT CHANGE UP (ref 39–50)
HGB BLD-MCNC: 13.9 G/DL — SIGNIFICANT CHANGE UP (ref 13–17)
MAGNESIUM SERPL-MCNC: 2.2 MG/DL — SIGNIFICANT CHANGE UP (ref 1.6–2.6)
MCHC RBC-ENTMCNC: 26.5 PG — LOW (ref 27–34)
MCHC RBC-ENTMCNC: 31.2 GM/DL — LOW (ref 32–36)
MCV RBC AUTO: 85 FL — SIGNIFICANT CHANGE UP (ref 80–100)
METHADONE UR-MCNC: NEGATIVE — SIGNIFICANT CHANGE UP
METHAQUALONE UR QL: NEGATIVE — SIGNIFICANT CHANGE UP
METHAQUALONE UR-MCNC: NEGATIVE — SIGNIFICANT CHANGE UP
NRBC # BLD: 0 /100 WBCS — SIGNIFICANT CHANGE UP (ref 0–0)
OPIATES UR-MCNC: NEGATIVE — SIGNIFICANT CHANGE UP
PCP UR-MCNC: NEGATIVE — SIGNIFICANT CHANGE UP
PHOSPHATE SERPL-MCNC: 3.2 MG/DL — SIGNIFICANT CHANGE UP (ref 2.5–4.5)
PLATELET # BLD AUTO: 212 K/UL — SIGNIFICANT CHANGE UP (ref 150–400)
POTASSIUM SERPL-MCNC: 5.3 MMOL/L — SIGNIFICANT CHANGE UP (ref 3.5–5.3)
POTASSIUM SERPL-SCNC: 5.3 MMOL/L — SIGNIFICANT CHANGE UP (ref 3.5–5.3)
PROPOXYPH UR QL: NEGATIVE — SIGNIFICANT CHANGE UP
RBC # BLD: 5.25 M/UL — SIGNIFICANT CHANGE UP (ref 4.2–5.8)
RBC # FLD: 13.7 % — SIGNIFICANT CHANGE UP (ref 10.3–14.5)
SODIUM SERPL-SCNC: 139 MMOL/L — SIGNIFICANT CHANGE UP (ref 135–145)
SPECIMEN SOURCE: SIGNIFICANT CHANGE UP
SPECIMEN SOURCE: SIGNIFICANT CHANGE UP
THC UR QL: NEGATIVE — SIGNIFICANT CHANGE UP
WBC # BLD: 9.98 K/UL — SIGNIFICANT CHANGE UP (ref 3.8–10.5)
WBC # FLD AUTO: 9.98 K/UL — SIGNIFICANT CHANGE UP (ref 3.8–10.5)

## 2021-03-24 PROCEDURE — 70551 MRI BRAIN STEM W/O DYE: CPT | Mod: 26

## 2021-03-24 PROCEDURE — 99233 SBSQ HOSP IP/OBS HIGH 50: CPT

## 2021-03-24 PROCEDURE — 72148 MRI LUMBAR SPINE W/O DYE: CPT | Mod: 26

## 2021-03-24 RX ORDER — SODIUM CHLORIDE 9 MG/ML
1000 INJECTION, SOLUTION INTRAVENOUS
Refills: 0 | Status: DISCONTINUED | OUTPATIENT
Start: 2021-03-24 | End: 2021-03-26

## 2021-03-24 RX ORDER — INSULIN GLARGINE 100 [IU]/ML
25 INJECTION, SOLUTION SUBCUTANEOUS EVERY MORNING
Refills: 0 | Status: DISCONTINUED | OUTPATIENT
Start: 2021-03-24 | End: 2021-03-26

## 2021-03-24 RX ADMIN — Medication 2: at 12:14

## 2021-03-24 RX ADMIN — Medication 2: at 22:46

## 2021-03-24 RX ADMIN — Medication 500 MILLIGRAM(S): at 05:44

## 2021-03-24 RX ADMIN — NYSTATIN CREAM 1 APPLICATION(S): 100000 CREAM TOPICAL at 05:44

## 2021-03-24 RX ADMIN — Medication 3: at 08:33

## 2021-03-24 RX ADMIN — SODIUM CHLORIDE 125 MILLILITER(S): 9 INJECTION, SOLUTION INTRAVENOUS at 05:44

## 2021-03-24 RX ADMIN — HEPARIN SODIUM 5000 UNIT(S): 5000 INJECTION INTRAVENOUS; SUBCUTANEOUS at 22:46

## 2021-03-24 RX ADMIN — Medication 500 MILLIGRAM(S): at 23:01

## 2021-03-24 RX ADMIN — NYSTATIN CREAM 1 APPLICATION(S): 100000 CREAM TOPICAL at 17:34

## 2021-03-24 RX ADMIN — SODIUM CHLORIDE 125 MILLILITER(S): 9 INJECTION, SOLUTION INTRAVENOUS at 22:53

## 2021-03-24 RX ADMIN — PANTOPRAZOLE SODIUM 40 MILLIGRAM(S): 20 TABLET, DELAYED RELEASE ORAL at 12:15

## 2021-03-24 RX ADMIN — HEPARIN SODIUM 5000 UNIT(S): 5000 INJECTION INTRAVENOUS; SUBCUTANEOUS at 13:54

## 2021-03-24 RX ADMIN — INSULIN GLARGINE 20 UNIT(S): 100 INJECTION, SOLUTION SUBCUTANEOUS at 08:35

## 2021-03-24 RX ADMIN — Medication 5 UNIT(S): at 16:42

## 2021-03-24 RX ADMIN — Medication 5 UNIT(S): at 08:34

## 2021-03-24 RX ADMIN — Medication 5 UNIT(S): at 12:14

## 2021-03-24 RX ADMIN — Medication 500 MILLIGRAM(S): at 17:34

## 2021-03-24 RX ADMIN — HEPARIN SODIUM 5000 UNIT(S): 5000 INJECTION INTRAVENOUS; SUBCUTANEOUS at 05:44

## 2021-03-24 RX ADMIN — Medication 500 MILLIGRAM(S): at 12:15

## 2021-03-24 RX ADMIN — SENNA PLUS 2 TABLET(S): 8.6 TABLET ORAL at 22:46

## 2021-03-24 RX ADMIN — Medication 2: at 16:41

## 2021-03-24 NOTE — PROGRESS NOTE ADULT - SUBJECTIVE AND OBJECTIVE BOX
Pt had MRI which were unremarkable.     PE- went for testing    Assessment and Recommendation:   · Assessment	  b/l toe numbness starting 3/23 not ascending no back pain/neck pain likely 2/2 newly diagnosed DM  Low suspicion for GBS    Plan  MRI Brain showing nonspecific white matter changes,MRI L spine unrevealing  will continue to follow

## 2021-03-24 NOTE — PROGRESS NOTE ADULT - SUBJECTIVE AND OBJECTIVE BOX
Patient is a 25y old  Male who presents with a chief complaint of     OVERNIGHT EVENTS:    REVIEW OF SYSTEMS: denies chest pain/SOB, diaphoresis, no F/C, cough, dizziness, headache, blurry vision, nausea, vomiting, abdominal pain. All others review of systems negative     MEDICATIONS  (STANDING):  cephalexin 500 milliGRAM(s) Oral every 6 hours  dextrose 40% Gel 15 Gram(s) Oral once  dextrose 5%. 1000 milliLiter(s) (50 mL/Hr) IV Continuous <Continuous>  dextrose 5%. 1000 milliLiter(s) (100 mL/Hr) IV Continuous <Continuous>  dextrose 50% Injectable 25 Gram(s) IV Push once  dextrose 50% Injectable 12.5 Gram(s) IV Push once  dextrose 50% Injectable 25 Gram(s) IV Push once  glucagon  Injectable 1 milliGRAM(s) IntraMuscular once  heparin   Injectable 5000 Unit(s) SubCutaneous every 8 hours  insulin glargine Injectable (LANTUS) 20 Unit(s) SubCutaneous every morning  insulin lispro (ADMELOG) corrective regimen sliding scale   SubCutaneous three times a day before meals  insulin lispro (ADMELOG) corrective regimen sliding scale   SubCutaneous at bedtime  insulin lispro Injectable (ADMELOG) 5 Unit(s) SubCutaneous before dinner  insulin lispro Injectable (ADMELOG) 5 Unit(s) SubCutaneous before breakfast  insulin lispro Injectable (ADMELOG) 5 Unit(s) SubCutaneous before lunch  nystatin Cream 1 Application(s) Topical two times a day  pantoprazole  Injectable 40 milliGRAM(s) IV Push daily  senna 2 Tablet(s) Oral at bedtime    MEDICATIONS  (PRN):  acetaminophen    Suspension .. 650 milliGRAM(s) Oral every 6 hours PRN Temp greater or equal to 38C (100.4F)      Allergies    No Known Allergies    Intolerances        T(F): 97.5 (03-24-21 @ 05:47), Max: 98.5 (03-23-21 @ 23:34)  HR: 90 (03-24-21 @ 05:47) (90 - 95)  BP: 127/84 (03-24-21 @ 05:47) (127/84 - 137/84)  RR: 18 (03-24-21 @ 05:47) (18 - 18)  SpO2: 98% (03-24-21 @ 05:47) (98% - 98%)  Wt(kg): --    PHYSICAL EXAM:  GENERAL: NAD  HEAD:  Atraumatic, Normocephalic  EYES: PERRLA, conjunctiva and sclera clear  ENMT: Moist mucous membranes  NECK: Supple, No JVD, Normal thyroid  NERVOUS SYSTEM:  Alert & Awake  CHEST/LUNG: Clear to percussion bilaterally;   HEART: Regular rate and rhythm;   ABDOMEN: Soft, Nontender, Nondistended; Bowel sounds present  EXTREMITIES:  no edema BL LE  SKIN: moist    LABS:                        13.9   9.98  )-----------( 212      ( 24 Mar 2021 09:16 )             44.6     03-24    139  |  105  |  32<H>  ----------------------------<  319<H>  5.3   |  17<L>  |  1.25    Ca    10.0      24 Mar 2021 09:16  Phos  3.2     03-24  Mg     2.2     03-24          Cultures;   CAPILLARY BLOOD GLUCOSE      POCT Blood Glucose.: 244 mg/dL (24 Mar 2021 12:01)  POCT Blood Glucose.: 280 mg/dL (24 Mar 2021 07:38)  POCT Blood Glucose.: 339 mg/dL (23 Mar 2021 21:27)    Lipid panel:   LDL --            RADIOLOGY & ADDITIONAL TESTS:    Imaging Personally Reviewed:  [x ] YES      Consultant(s) Notes Reviewed:  [x ] YES     Care Discussed with [x ] Consultants [X ] Patient [ ] Family  [x ]    [x ]  Other; RN

## 2021-03-24 NOTE — PROGRESS NOTE ADULT - SUBJECTIVE AND OBJECTIVE BOX
Patient is a 25y old  Male who presents with a chief complaint of     Interval History: finger sticks are in 250-350 range   on Lantus 25 units and prandial lispro 5 units   Nutrition adequate    MEDICATIONS  (STANDING):  cephalexin 500 milliGRAM(s) Oral every 6 hours  dextrose 40% Gel 15 Gram(s) Oral once  dextrose 5%. 1000 milliLiter(s) (50 mL/Hr) IV Continuous <Continuous>  dextrose 5%. 1000 milliLiter(s) (100 mL/Hr) IV Continuous <Continuous>  dextrose 50% Injectable 25 Gram(s) IV Push once  dextrose 50% Injectable 12.5 Gram(s) IV Push once  dextrose 50% Injectable 25 Gram(s) IV Push once  glucagon  Injectable 1 milliGRAM(s) IntraMuscular once  heparin   Injectable 5000 Unit(s) SubCutaneous every 8 hours  insulin glargine Injectable (LANTUS) 25 Unit(s) SubCutaneous every morning  insulin lispro (ADMELOG) corrective regimen sliding scale   SubCutaneous three times a day before meals  insulin lispro (ADMELOG) corrective regimen sliding scale   SubCutaneous at bedtime  insulin lispro Injectable (ADMELOG) 5 Unit(s) SubCutaneous before dinner  insulin lispro Injectable (ADMELOG) 5 Unit(s) SubCutaneous before breakfast  insulin lispro Injectable (ADMELOG) 5 Unit(s) SubCutaneous before lunch  nystatin Cream 1 Application(s) Topical two times a day  pantoprazole  Injectable 40 milliGRAM(s) IV Push daily  senna 2 Tablet(s) Oral at bedtime  sodium chloride 0.45%. 1000 milliLiter(s) (125 mL/Hr) IV Continuous <Continuous>    MEDICATIONS  (PRN):  acetaminophen    Suspension .. 650 milliGRAM(s) Oral every 6 hours PRN Temp greater or equal to 38C (100.4F)      Allergies    No Known Allergies    Intolerances        REVIEW OF SYSTEMS:  CONSTITUTIONAL: no changes      Vital Signs Last 24 Hrs  T(C): 36.7 (24 Mar 2021 17:00), Max: 36.9 (23 Mar 2021 23:34)  T(F): 98 (24 Mar 2021 17:00), Max: 98.5 (23 Mar 2021 23:34)  HR: 91 (24 Mar 2021 17:00) (90 - 102)  BP: 141/81 (24 Mar 2021 17:00) (127/84 - 169/96)  BP(mean): --  RR: 16 (24 Mar 2021 17:00) (16 - 18)  SpO2: 97% (24 Mar 2021 17:00) (97% - 98%)    PHYSICAL EXAM:  GENERAL:   HEAD: Atraumatic, Normocephalic  EYES: PERRLA, conjunctiva and sclera clear  ENMT: No tonsillar erythema, exudates, or enlargement; Moist mucous membranes, Good dentition, No lesions  NECK: Supple, No JVD, Normal thyroid  NERVOUS SYSTEM:  Alert & Oriented X3, Good concentration; Motor Strength 5/5 B/L upper and lower extremities  CHEST/LUNG: Clear to auscultaion bilaterally; No rales, rhonchi, wheezing, or rubs  HEART: Regular rate and rhythm; No murmurs, rubs, or gallops  ABDOMEN: Soft, Nontender, Nondistended; Bowel sounds present  EXTREMITIES:  2+ Peripheral Pulses, no edema  SKIN: No rashes or lesions    LABS:        CAPILLARY BLOOD GLUCOSE      POCT Blood Glucose.: 264 mg/dL (24 Mar 2021 21:09)  POCT Blood Glucose.: 390 mg/dL (24 Mar 2021 21:08)  POCT Blood Glucose.: 234 mg/dL (24 Mar 2021 16:36)  POCT Blood Glucose.: 244 mg/dL (24 Mar 2021 12:01)  POCT Blood Glucose.: 280 mg/dL (24 Mar 2021 07:38)    Lipid panel:           Thyroid:  Diabetes Tests:  Parathyroid Panel:  Adrenals:  RADIOLOGY & ADDITIONAL TESTS:    Imaging Personally Reviewed:  [ ] YES  [ ] NO    Consultant(s) Notes Reviewed:  [ ] YES  [ ] NO    Care Discussed with Consultants/Other Providers [ ] YES  [ ] NO

## 2021-03-24 NOTE — PROGRESS NOTE ADULT - PROBLEM SELECTOR PLAN 1
Continue with the same regimen while inpatient   suspect increased Insulin Resistance   Check C-Peptide , if sufficient , add Insulin sensitizers

## 2021-03-24 NOTE — PROGRESS NOTE ADULT - SUBJECTIVE AND OBJECTIVE BOX
[facility] NEPHROLOGY SERVICES, [facility]  NEPHROLOGY AND HYPERTENSION  [address]  [address]  [place] [address]  [phone]    MD XOCHILT LOPEZ MD ANDREY GONCHARUK, MD MADHU KORRAPATI, MD YELENA ROSENBERG, MD BINNY KOSHY, MD CHRISTOPHER CAPUTO, MD EDWARD BOVER, MD          Patient events noted    MEDICATIONS  (STANDING):  cephalexin 500 milliGRAM(s) Oral every 6 hours  dextrose 40% Gel 15 Gram(s) Oral once  dextrose 5%. 1000 milliLiter(s) (50 mL/Hr) IV Continuous <Continuous>  dextrose 5%. 1000 milliLiter(s) (100 mL/Hr) IV Continuous <Continuous>  dextrose 50% Injectable 25 Gram(s) IV Push once  dextrose 50% Injectable 12.5 Gram(s) IV Push once  dextrose 50% Injectable 25 Gram(s) IV Push once  glucagon  Injectable 1 milliGRAM(s) IntraMuscular once  heparin   Injectable 5000 Unit(s) SubCutaneous every 8 hours  insulin glargine Injectable (LANTUS) 25 Unit(s) SubCutaneous every morning  insulin lispro (ADMELOG) corrective regimen sliding scale   SubCutaneous three times a day before meals  insulin lispro (ADMELOG) corrective regimen sliding scale   SubCutaneous at bedtime  insulin lispro Injectable (ADMELOG) 5 Unit(s) SubCutaneous before dinner  insulin lispro Injectable (ADMELOG) 5 Unit(s) SubCutaneous before breakfast  insulin lispro Injectable (ADMELOG) 5 Unit(s) SubCutaneous before lunch  nystatin Cream 1 Application(s) Topical two times a day  pantoprazole  Injectable 40 milliGRAM(s) IV Push daily  senna 2 Tablet(s) Oral at bedtime  sodium chloride 0.45%. 1000 milliLiter(s) (125 mL/Hr) IV Continuous <Continuous>    MEDICATIONS  (PRN):  acetaminophen    Suspension .. 650 milliGRAM(s) Oral every 6 hours PRN Temp greater or equal to 38C (100.4F)      03-23-21 @ 07:01  -  03-24-21 @ 07:00  --------------------------------------------------------  IN: 1120 mL / OUT: 1500 mL / NET: -380 mL    03-24-21 @ 07:01  -  03-24-21 @ 19:46  --------------------------------------------------------  IN: 300 mL / OUT: 0 mL / NET: 300 mL      PHYSICAL EXAM:      T(C): 36.7 (03-24-21 @ 17:00), Max: 36.9 (03-23-21 @ 23:34)  HR: 91 (03-24-21 @ 17:00) (90 - 102)  BP: 141/81 (03-24-21 @ 17:00) (127/84 - 169/96)  RR: 16 (03-24-21 @ 17:00) (16 - 18)  SpO2: 97% (03-24-21 @ 17:00) (97% - 98%)  Wt(kg): --  Lungs clear  Heart S1S2  Abd soft NT ND  Extremities:   tr edema                                    13.9   9.98  )-----------( 212      ( 24 Mar 2021 09:16 )             44.6     03-24    139  |  105  |  32<H>  ----------------------------<  319<H>  5.3   |  17<L>  |  1.25    Ca    10.0      24 Mar 2021 09:16  Phos  3.2     03-24  Mg     2.2     03-24          Creatinine Trend: 1.25<--, 1.28<--, 1.44<--, 1.53<--, 1.89<--, 3.11<--        Assessment     ASHLEE hypernatremia DKA, pre renal azotemia   Hyperkalemia related to hyperglycemia; increasing AG  Pancreatitis    Plan:  IVF maintenance   Follow ketones   IV insulin   Will follow.       Eddy Bello MD

## 2021-03-24 NOTE — PROGRESS NOTE ADULT - ASSESSMENT
26yo M with no known PMH presented for lethargy and altered mental status, found to have Glucose 1374, Cr 5.2 and , anion gap 29.  Admitted for DKA, severe dehydration, acute renal insufficiency, uremia, acute metabolic encephalopathy, sepsis with septic shock.  Course complicated by acute hypoxic respiratory failure requiring intubation 3/19. Extubated 3/20.  Transferred to hospitalist service 3/22.      # DKA - New onset DM - AGAP has normalized.  Discussed need for insulin and diabetic teaching.  Endocrine following.  Continue Admelog, Lantus.  Monitor FS.  # B/l foot numbness - likely neuropathy, improving.  motor function intact.  Neurology input appreciated. MRI Head-neg. MRI L-spine; mild scoliosis  # ASHLEE / Dehydration / Hypernatremia/ Hyperkalemia - Cr has significantly improved with IVF.  Monitor K.  Renal followup.  Repeat BMP in am.   # Uremia, Metabolic Encephalopathy - improved.    # S/p Acute Hypoxic Respiratory Failure - due to above, improved.  # HLD-  Chol 221,   # Fever, s/p septic shock - attributed to pneumonia.  Initial CXR showed no infiltrate.  Fevers have improved.  WBC wnl.  Will deescalate to Keflex and monitor.  If fever recurs, would consider further ID input.  # DVT Prophylaxis - Heparin subcut

## 2021-03-25 DIAGNOSIS — E11.65 TYPE 2 DIABETES MELLITUS WITH HYPERGLYCEMIA: ICD-10-CM

## 2021-03-25 LAB
ACETONE SERPL-MCNC: ABNORMAL
ANION GAP SERPL CALC-SCNC: 13 MMOL/L — SIGNIFICANT CHANGE UP (ref 5–17)
BUN SERPL-MCNC: 22 MG/DL — SIGNIFICANT CHANGE UP (ref 7–23)
C PEPTIDE SERPL-MCNC: 1.1 NG/ML — SIGNIFICANT CHANGE UP (ref 1.1–4.4)
CALCIUM SERPL-MCNC: 9.1 MG/DL — SIGNIFICANT CHANGE UP (ref 8.5–10.1)
CHLORIDE SERPL-SCNC: 104 MMOL/L — SIGNIFICANT CHANGE UP (ref 96–108)
CO2 SERPL-SCNC: 21 MMOL/L — LOW (ref 22–31)
CREAT SERPL-MCNC: 0.96 MG/DL — SIGNIFICANT CHANGE UP (ref 0.5–1.3)
GLUCOSE BLDC GLUCOMTR-MCNC: 256 MG/DL — HIGH (ref 70–99)
GLUCOSE BLDC GLUCOMTR-MCNC: 295 MG/DL — HIGH (ref 70–99)
GLUCOSE BLDC GLUCOMTR-MCNC: 302 MG/DL — HIGH (ref 70–99)
GLUCOSE BLDC GLUCOMTR-MCNC: 308 MG/DL — HIGH (ref 70–99)
GLUCOSE SERPL-MCNC: 272 MG/DL — HIGH (ref 70–99)
LIDOCAIN IGE QN: 3605 U/L — HIGH (ref 73–393)
POTASSIUM SERPL-MCNC: 4.6 MMOL/L — SIGNIFICANT CHANGE UP (ref 3.5–5.3)
POTASSIUM SERPL-SCNC: 4.6 MMOL/L — SIGNIFICANT CHANGE UP (ref 3.5–5.3)
SODIUM SERPL-SCNC: 138 MMOL/L — SIGNIFICANT CHANGE UP (ref 135–145)

## 2021-03-25 PROCEDURE — 99233 SBSQ HOSP IP/OBS HIGH 50: CPT

## 2021-03-25 RX ORDER — INSULIN LISPRO 100/ML
8 VIAL (ML) SUBCUTANEOUS
Refills: 0 | Status: DISCONTINUED | OUTPATIENT
Start: 2021-03-25 | End: 2021-03-26

## 2021-03-25 RX ADMIN — Medication 4: at 21:57

## 2021-03-25 RX ADMIN — Medication 500 MILLIGRAM(S): at 17:40

## 2021-03-25 RX ADMIN — HEPARIN SODIUM 5000 UNIT(S): 5000 INJECTION INTRAVENOUS; SUBCUTANEOUS at 13:38

## 2021-03-25 RX ADMIN — INSULIN GLARGINE 25 UNIT(S): 100 INJECTION, SOLUTION SUBCUTANEOUS at 08:16

## 2021-03-25 RX ADMIN — Medication 5 UNIT(S): at 08:23

## 2021-03-25 RX ADMIN — Medication 3: at 11:51

## 2021-03-25 RX ADMIN — SENNA PLUS 2 TABLET(S): 8.6 TABLET ORAL at 21:58

## 2021-03-25 RX ADMIN — NYSTATIN CREAM 1 APPLICATION(S): 100000 CREAM TOPICAL at 05:06

## 2021-03-25 RX ADMIN — Medication 8 UNIT(S): at 11:52

## 2021-03-25 RX ADMIN — Medication 500 MILLIGRAM(S): at 05:06

## 2021-03-25 RX ADMIN — NYSTATIN CREAM 1 APPLICATION(S): 100000 CREAM TOPICAL at 17:40

## 2021-03-25 RX ADMIN — SODIUM CHLORIDE 125 MILLILITER(S): 9 INJECTION, SOLUTION INTRAVENOUS at 19:05

## 2021-03-25 RX ADMIN — Medication 3: at 16:23

## 2021-03-25 RX ADMIN — Medication 500 MILLIGRAM(S): at 12:08

## 2021-03-25 RX ADMIN — PANTOPRAZOLE SODIUM 40 MILLIGRAM(S): 20 TABLET, DELAYED RELEASE ORAL at 12:08

## 2021-03-25 RX ADMIN — HEPARIN SODIUM 5000 UNIT(S): 5000 INJECTION INTRAVENOUS; SUBCUTANEOUS at 05:06

## 2021-03-25 RX ADMIN — HEPARIN SODIUM 5000 UNIT(S): 5000 INJECTION INTRAVENOUS; SUBCUTANEOUS at 21:58

## 2021-03-25 RX ADMIN — Medication 4: at 08:21

## 2021-03-25 RX ADMIN — Medication 8 UNIT(S): at 16:23

## 2021-03-25 NOTE — PROGRESS NOTE ADULT - ASSESSMENT
24yo M with no known PMH presented for lethargy and altered mental status, found to have Glucose 1374, Cr 5.2 and , anion gap 29.  Admitted for DKA, severe dehydration, acute renal insufficiency, uremia, acute metabolic encephalopathy, sepsis with septic shock.  Course complicated by acute hypoxic respiratory failure requiring intubation 3/19. Extubated 3/20.  Transferred to hospitalist service 3/22.      # DKA - New onset DM - AGAP has normalized. Discussed need for insulin and diabetic teaching. Endocrine following. Continue Admelog, Lantus.  Monitor FS.  # B/l foot numbness - likely neuropathy, improving.  motor function intact. Neurology input appreciated. MRI Head-neg. MRI L-spine; mild scoliosis  # ASHLEE / Dehydration / Hypernatremia/ Hyperkalemia - Cr has significantly improved with IVF.  Monitor K. Renal followup.  Repeat BMP in am.   # Uremia, Metabolic Encephalopathy - improved.    # S/p Acute Hypoxic Respiratory Failure - due to above, improved.  # HLD-  Chol 221,   # s/p septic shock - attributed to pneumonia. Initial CXR showed no infiltrate. Fevers have improved. WBC wnl. Will deescalate to Keflex and monitor.  If fever recurs, would consider further ID input.  # DVT Prophylaxis - Heparin subcut

## 2021-03-25 NOTE — PROGRESS NOTE ADULT - PROBLEM SELECTOR PLAN 1
now stable finger sticks   Diabetic Ketoacidosis resolved   continue with current regimen   Metformin may be added now provided C-Peptide comes back sufficient

## 2021-03-25 NOTE — PROGRESS NOTE ADULT - SUBJECTIVE AND OBJECTIVE BOX
Patient is a 25y old  Male who presents with a chief complaint of     Interval History: finger sticks are stable but in mid 200's   on Lantus 25 units and prandial lispro 8 units   Creatinine now normal       MEDICATIONS  (STANDING):  cephalexin 500 milliGRAM(s) Oral every 6 hours  dextrose 40% Gel 15 Gram(s) Oral once  dextrose 5%. 1000 milliLiter(s) (50 mL/Hr) IV Continuous <Continuous>  dextrose 5%. 1000 milliLiter(s) (100 mL/Hr) IV Continuous <Continuous>  dextrose 50% Injectable 25 Gram(s) IV Push once  dextrose 50% Injectable 12.5 Gram(s) IV Push once  dextrose 50% Injectable 25 Gram(s) IV Push once  glucagon  Injectable 1 milliGRAM(s) IntraMuscular once  heparin   Injectable 5000 Unit(s) SubCutaneous every 8 hours  insulin glargine Injectable (LANTUS) 25 Unit(s) SubCutaneous every morning  insulin lispro (ADMELOG) corrective regimen sliding scale   SubCutaneous three times a day before meals  insulin lispro (ADMELOG) corrective regimen sliding scale   SubCutaneous at bedtime  insulin lispro Injectable (ADMELOG) 8 Unit(s) SubCutaneous three times a day before meals  nystatin Cream 1 Application(s) Topical two times a day  pantoprazole  Injectable 40 milliGRAM(s) IV Push daily  senna 2 Tablet(s) Oral at bedtime  sodium chloride 0.45%. 1000 milliLiter(s) (125 mL/Hr) IV Continuous <Continuous>    MEDICATIONS  (PRN):  acetaminophen    Suspension .. 650 milliGRAM(s) Oral every 6 hours PRN Temp greater or equal to 38C (100.4F)      Allergies    No Known Allergies    Intolerances        REVIEW OF SYSTEMS:  CONSTITUTIONAL: no changes  EYES: No eye pain, visual disturbances, or discharge  ENMT:  No difficulty hearing, No sinus or throat pain  NECK: No pain or stiffness  RESPIRATORY: No cough, wheezing, chills or hemoptysis; No shortness of breath  CARDIOVASCULAR: No chest pain, palpitations or leg swelling  GASTROINTESTINAL: No abdominal or epigastric pain. No nausea, vomiting, or hematemesis; No diarrhea or constipation. No melena or hematochezia.  GENITOURINARY: No dysuria, frequency, hematuria, or incontinence  NEUROLOGICAL: No headaches, memory loss, loss of strength, numbness, or tremors  SKIN: No itching, burning, rashes, or lesions   ENDOCRINE: No heat or cold intolerance; No hair loss  MUSCULOSKELETAL: No joint pain or swelling; No muscle, back, or extremity pain  PSYCHIATRIC: No depression, anxiety, mood swings, or difficulty sleeping  HEME/LYMPH: No easy bruising, or bleeding gums  ALLERY AND IMMUNOLOGIC: No hives or eczema    Vital Signs Last 24 Hrs  T(C): 36.8 (25 Mar 2021 17:00), Max: 37.2 (24 Mar 2021 23:43)  T(F): 98.3 (25 Mar 2021 17:00), Max: 98.9 (24 Mar 2021 23:43)  HR: 89 (25 Mar 2021 17:00) (88 - 103)  BP: 137/85 (25 Mar 2021 17:00) (123/80 - 137/85)  BP(mean): --  RR: 18 (25 Mar 2021 17:00) (18 - 18)  SpO2: 98% (25 Mar 2021 17:00) (97% - 98%)    PHYSICAL EXAM:  GENERAL:   HEAD: Atraumatic, Normocephalic  EYES: PERRLA, conjunctiva and sclera clear  ENMT: No tonsillar erythema, exudates, or enlargement; Moist mucous membranes, Good dentition, No lesions  NECK: Supple, No JVD, Normal thyroid  NERVOUS SYSTEM:  Alert & Oriented X3, Good concentration; Motor Strength 5/5 B/L upper and lower extremities  CHEST/LUNG: Clear to auscultaion bilaterally; No rales, rhonchi, wheezing, or rubs  HEART: Regular rate and rhythm; No murmurs, rubs, or gallops  ABDOMEN: Soft, Nontender, Nondistended; Bowel sounds present  EXTREMITIES:  2+ Peripheral Pulses, no edema  SKIN: No rashes or lesions    LABS:        CAPILLARY BLOOD GLUCOSE      POCT Blood Glucose.: 256 mg/dL (25 Mar 2021 15:49)  POCT Blood Glucose.: 295 mg/dL (25 Mar 2021 11:16)  POCT Blood Glucose.: 302 mg/dL (25 Mar 2021 07:37)  POCT Blood Glucose.: 253 mg/dL (24 Mar 2021 22:30)  POCT Blood Glucose.: 264 mg/dL (24 Mar 2021 21:09)  POCT Blood Glucose.: 390 mg/dL (24 Mar 2021 21:08)    Lipid panel:           Thyroid:  Diabetes Tests:03-25 @ 12:14 HbA1c -- C-Peptide 1.1    Parathyroid Panel:  Adrenals:  RADIOLOGY & ADDITIONAL TESTS:    Imaging Personally Reviewed:  [ ] YES  [ ] NO    Consultant(s) Notes Reviewed:  [ ] YES  [ ] NO    Care Discussed with Consultants/Other Providers [ ] YES  [ ] NO

## 2021-03-25 NOTE — PROGRESS NOTE ADULT - SUBJECTIVE AND OBJECTIVE BOX
Patient is a 25y old  Male who presents with a chief complaint of     OVERNIGHT EVENTS:  none    REVIEW OF SYSTEMS: denies chest pain/SOB, diaphoresis, no F/C, cough, dizziness, headache, blurry vision, nausea, vomiting, abdominal pain. All others review of systems negative     MEDICATIONS  (STANDING):  cephalexin 500 milliGRAM(s) Oral every 6 hours  dextrose 40% Gel 15 Gram(s) Oral once  dextrose 5%. 1000 milliLiter(s) (50 mL/Hr) IV Continuous <Continuous>  dextrose 5%. 1000 milliLiter(s) (100 mL/Hr) IV Continuous <Continuous>  dextrose 50% Injectable 25 Gram(s) IV Push once  dextrose 50% Injectable 12.5 Gram(s) IV Push once  dextrose 50% Injectable 25 Gram(s) IV Push once  glucagon  Injectable 1 milliGRAM(s) IntraMuscular once  heparin   Injectable 5000 Unit(s) SubCutaneous every 8 hours  insulin glargine Injectable (LANTUS) 25 Unit(s) SubCutaneous every morning  insulin lispro (ADMELOG) corrective regimen sliding scale   SubCutaneous three times a day before meals  insulin lispro (ADMELOG) corrective regimen sliding scale   SubCutaneous at bedtime  insulin lispro Injectable (ADMELOG) 8 Unit(s) SubCutaneous three times a day before meals  nystatin Cream 1 Application(s) Topical two times a day  pantoprazole  Injectable 40 milliGRAM(s) IV Push daily  senna 2 Tablet(s) Oral at bedtime  sodium chloride 0.45%. 1000 milliLiter(s) (125 mL/Hr) IV Continuous <Continuous>    MEDICATIONS  (PRN):  acetaminophen    Suspension .. 650 milliGRAM(s) Oral every 6 hours PRN Temp greater or equal to 38C (100.4F)      Allergies    No Known Allergies    Intolerances        T(F): 98.2 (03-25-21 @ 11:29), Max: 98.9 (03-24-21 @ 23:43)  HR: 90 (03-25-21 @ 11:29) (88 - 103)  BP: 123/80 (03-25-21 @ 11:29) (123/80 - 141/81)  RR: 18 (03-25-21 @ 11:29) (16 - 18)  SpO2: 98% (03-25-21 @ 11:29) (97% - 98%)  Wt(kg): --    PHYSICAL EXAM:  GENERAL: NAD  HEAD:  Atraumatic, Normocephalic  EYES: PERRLA, conjunctiva and sclera clear  ENMT: Moist mucous membranes  NECK: Supple, No JVD, Normal thyroid  NERVOUS SYSTEM:  Alert & Awake  CHEST/LUNG: Clear to percussion bilaterally;   HEART: Regular rate and rhythm;   ABDOMEN: Soft, Nontender, Nondistended; Bowel sounds present  EXTREMITIES:  no edema BL LE  SKIN: moist    LABS:                        13.9   9.98  )-----------( 212      ( 24 Mar 2021 09:16 )             44.6     03-25    138  |  104  |  22  ----------------------------<  272<H>  4.6   |  21<L>  |  0.96    Ca    9.1      25 Mar 2021 06:39  Phos  3.2     03-24  Mg     2.2     03-24          Cultures;   CAPILLARY BLOOD GLUCOSE      POCT Blood Glucose.: 295 mg/dL (25 Mar 2021 11:16)  POCT Blood Glucose.: 302 mg/dL (25 Mar 2021 07:37)  POCT Blood Glucose.: 253 mg/dL (24 Mar 2021 22:30)  POCT Blood Glucose.: 264 mg/dL (24 Mar 2021 21:09)  POCT Blood Glucose.: 390 mg/dL (24 Mar 2021 21:08)  POCT Blood Glucose.: 234 mg/dL (24 Mar 2021 16:36)    Lipid panel:   LDL --            RADIOLOGY & ADDITIONAL TESTS:    Imaging Personally Reviewed:  [x ] YES      Consultant(s) Notes Reviewed:  [x ] YES     Care Discussed with [x ] Consultants [X ] Patient [ ] Family  [x ]    [x ]  Other; RN

## 2021-03-25 NOTE — PROGRESS NOTE ADULT - SUBJECTIVE AND OBJECTIVE BOX
Pt resting comfortably. Pt says numbness resolved. Denies weakness, shortness of breath.     PE- AOX3. Language intact. CN2-12 grossly intact. MEDINA. Sensation intact b/l to LT. Reflexes +1-2    Assessment and Recommendation:   · Assessment	  b/l toe numbness starting 3/23 not ascending no back pain/neck pain likely 2/2 newly diagnosed DM  Low suspicion for GBS    Plan  MRI Brain showing nonspecific white matter changes, MRI L spine unrevealing  neuro stable, reconsult prn

## 2021-03-26 VITALS
RESPIRATION RATE: 18 BRPM | HEART RATE: 98 BPM | SYSTOLIC BLOOD PRESSURE: 141 MMHG | TEMPERATURE: 99 F | DIASTOLIC BLOOD PRESSURE: 88 MMHG

## 2021-03-26 LAB
ANION GAP SERPL CALC-SCNC: 15 MMOL/L — SIGNIFICANT CHANGE UP (ref 5–17)
BUN SERPL-MCNC: 18 MG/DL — SIGNIFICANT CHANGE UP (ref 7–23)
CALCIUM SERPL-MCNC: 9.5 MG/DL — SIGNIFICANT CHANGE UP (ref 8.5–10.1)
CHLORIDE SERPL-SCNC: 103 MMOL/L — SIGNIFICANT CHANGE UP (ref 96–108)
CO2 SERPL-SCNC: 20 MMOL/L — LOW (ref 22–31)
CREAT SERPL-MCNC: 1 MG/DL — SIGNIFICANT CHANGE UP (ref 0.5–1.3)
GLUCOSE BLDC GLUCOMTR-MCNC: 299 MG/DL — HIGH (ref 70–99)
GLUCOSE BLDC GLUCOMTR-MCNC: 320 MG/DL — HIGH (ref 70–99)
GLUCOSE BLDC GLUCOMTR-MCNC: 326 MG/DL — HIGH (ref 70–99)
GLUCOSE SERPL-MCNC: 330 MG/DL — HIGH (ref 70–99)
LIDOCAIN IGE QN: 3703 U/L — HIGH (ref 73–393)
POTASSIUM SERPL-MCNC: 4.6 MMOL/L — SIGNIFICANT CHANGE UP (ref 3.5–5.3)
POTASSIUM SERPL-SCNC: 4.6 MMOL/L — SIGNIFICANT CHANGE UP (ref 3.5–5.3)
SODIUM SERPL-SCNC: 138 MMOL/L — SIGNIFICANT CHANGE UP (ref 135–145)

## 2021-03-26 PROCEDURE — 99239 HOSP IP/OBS DSCHRG MGMT >30: CPT

## 2021-03-26 RX ORDER — INSULIN LISPRO 100/ML
10 VIAL (ML) SUBCUTANEOUS
Qty: 10 | Refills: 0
Start: 2021-03-26 | End: 2021-04-04

## 2021-03-26 RX ORDER — INSULIN GLARGINE 100 [IU]/ML
30 INJECTION, SOLUTION SUBCUTANEOUS
Qty: 10 | Refills: 0
Start: 2021-03-26 | End: 2021-04-04

## 2021-03-26 RX ORDER — METFORMIN HYDROCHLORIDE 850 MG/1
500 TABLET ORAL
Refills: 0 | Status: DISCONTINUED | OUTPATIENT
Start: 2021-03-26 | End: 2021-03-26

## 2021-03-26 RX ORDER — INSULIN GLARGINE 100 [IU]/ML
5 INJECTION, SOLUTION SUBCUTANEOUS ONCE
Refills: 0 | Status: COMPLETED | OUTPATIENT
Start: 2021-03-26 | End: 2021-03-26

## 2021-03-26 RX ORDER — INSULIN GLARGINE 100 [IU]/ML
30 INJECTION, SOLUTION SUBCUTANEOUS
Refills: 0 | Status: DISCONTINUED | OUTPATIENT
Start: 2021-03-27 | End: 2021-03-26

## 2021-03-26 RX ORDER — INSULIN LISPRO 100/ML
10 VIAL (ML) SUBCUTANEOUS
Refills: 0 | Status: DISCONTINUED | OUTPATIENT
Start: 2021-03-26 | End: 2021-03-26

## 2021-03-26 RX ORDER — ISOPROPYL ALCOHOL, BENZOCAINE .7; .06 ML/ML; ML/ML
1 SWAB TOPICAL
Qty: 120 | Refills: 1
Start: 2021-03-26 | End: 2021-05-24

## 2021-03-26 RX ADMIN — Medication 500 MILLIGRAM(S): at 04:06

## 2021-03-26 RX ADMIN — Medication 3: at 08:07

## 2021-03-26 RX ADMIN — Medication 500 MILLIGRAM(S): at 06:20

## 2021-03-26 RX ADMIN — SODIUM CHLORIDE 125 MILLILITER(S): 9 INJECTION, SOLUTION INTRAVENOUS at 06:21

## 2021-03-26 RX ADMIN — NYSTATIN CREAM 1 APPLICATION(S): 100000 CREAM TOPICAL at 06:21

## 2021-03-26 RX ADMIN — Medication 4: at 16:25

## 2021-03-26 RX ADMIN — NYSTATIN CREAM 1 APPLICATION(S): 100000 CREAM TOPICAL at 17:49

## 2021-03-26 RX ADMIN — Medication 4: at 11:42

## 2021-03-26 RX ADMIN — Medication 8 UNIT(S): at 08:08

## 2021-03-26 RX ADMIN — Medication 500 MILLIGRAM(S): at 11:43

## 2021-03-26 RX ADMIN — SODIUM CHLORIDE 125 MILLILITER(S): 9 INJECTION, SOLUTION INTRAVENOUS at 17:51

## 2021-03-26 RX ADMIN — METFORMIN HYDROCHLORIDE 500 MILLIGRAM(S): 850 TABLET ORAL at 17:49

## 2021-03-26 RX ADMIN — Medication 10 UNIT(S): at 16:25

## 2021-03-26 RX ADMIN — INSULIN GLARGINE 25 UNIT(S): 100 INJECTION, SOLUTION SUBCUTANEOUS at 08:08

## 2021-03-26 RX ADMIN — HEPARIN SODIUM 5000 UNIT(S): 5000 INJECTION INTRAVENOUS; SUBCUTANEOUS at 14:08

## 2021-03-26 RX ADMIN — PANTOPRAZOLE SODIUM 40 MILLIGRAM(S): 20 TABLET, DELAYED RELEASE ORAL at 11:43

## 2021-03-26 RX ADMIN — INSULIN GLARGINE 5 UNIT(S): 100 INJECTION, SOLUTION SUBCUTANEOUS at 11:43

## 2021-03-26 RX ADMIN — Medication 10 UNIT(S): at 11:46

## 2021-03-26 RX ADMIN — HEPARIN SODIUM 5000 UNIT(S): 5000 INJECTION INTRAVENOUS; SUBCUTANEOUS at 06:20

## 2021-03-26 NOTE — DISCHARGE NOTE PROVIDER - HOSPITAL COURSE
Pt is a 24yo M with no known PMH presented for lethargy and altered mental status, found to have Glucose 1374, Cr 5.2 and , anion gap 29.  Admitted for DKA, severe dehydration, acute renal insufficiency, uremia, acute metabolic encephalopathy, sepsis with septic shock.  Course complicated by acute hypoxic respiratory failure requiring intubation 3/19. Extubated 3/20.  Transferred to hospitalist service 3/22.      # DKA - New onset DM - AGAP has normalized. Discussed need for insulin and diabetic teaching. Endocrine following. Continue Admelog, Lantus.  Monitor FS.  Will need insulin on discharge.  Continue 30u of insulin daily at bedtime and 10units three times a day premeal.      # B/l foot numbness - likely neuropathy, improving.  motor function intact. Neurology consulted. MRI Head-neg. MRI L-spine; mild scoliosis  # ASHLEE / Dehydration / Hypernatremia/ Hyperkalemia - Cr has significantly improved with IVF.  Monitor K. Renal followup.  Resolved.  # Uremia, Metabolic Encephalopathy - improved.    # S/p Acute Hypoxic Respiratory Failure - due to above, improved.  # HLD-  Chol 221,   # s/p septic shock - attributed to pneumonia. Initial CXR showed no infiltrate. Fevers have improved. WBC wnl. Will deescalate to Keflex and monitor.  Not further fevers, completed Keflex.    # DVT Prophylaxis - Heparin subcut      Pt seen and examined by attending, Dr. Lau, deemed clinically stable for discharge home.

## 2021-03-26 NOTE — DISCHARGE NOTE NURSING/CASE MANAGEMENT/SOCIAL WORK - PATIENT PORTAL LINK FT
You can access the FollowMyHealth Patient Portal offered by [facility] by registering at the following website: [email or web address]. By joining [facility]’s FollowMyHealth portal, you will also be able to view your health information using other applications (apps) compatible with our system.

## 2021-03-26 NOTE — PROGRESS NOTE ADULT - SUBJECTIVE AND OBJECTIVE BOX
Patient is a 25y old  Male who presents with a chief complaint of     INTERVAL HPI/OVERNIGHT EVENTS:  pt with no complaints  eating well, good appetite    MEDICATIONS  (STANDING):  cephalexin 500 milliGRAM(s) Oral every 6 hours  dextrose 40% Gel 15 Gram(s) Oral once  dextrose 5%. 1000 milliLiter(s) (50 mL/Hr) IV Continuous <Continuous>  dextrose 5%. 1000 milliLiter(s) (100 mL/Hr) IV Continuous <Continuous>  dextrose 50% Injectable 25 Gram(s) IV Push once  dextrose 50% Injectable 12.5 Gram(s) IV Push once  dextrose 50% Injectable 25 Gram(s) IV Push once  glucagon  Injectable 1 milliGRAM(s) IntraMuscular once  heparin   Injectable 5000 Unit(s) SubCutaneous every 8 hours  insulin glargine Injectable (LANTUS) 5 Unit(s) SubCutaneous once  insulin lispro (ADMELOG) corrective regimen sliding scale   SubCutaneous three times a day before meals  insulin lispro (ADMELOG) corrective regimen sliding scale   SubCutaneous at bedtime  insulin lispro Injectable (ADMELOG) 10 Unit(s) SubCutaneous three times a day before meals  metFORMIN 500 milliGRAM(s) Oral two times a day  nystatin Cream 1 Application(s) Topical two times a day  pantoprazole  Injectable 40 milliGRAM(s) IV Push daily  senna 2 Tablet(s) Oral at bedtime  sodium chloride 0.45%. 1000 milliLiter(s) (125 mL/Hr) IV Continuous <Continuous>    MEDICATIONS  (PRN):  acetaminophen    Suspension .. 650 milliGRAM(s) Oral every 6 hours PRN Temp greater or equal to 38C (100.4F)      REVIEW OF SYSTEMS:  CONSTITUTIONAL: No fever, weight loss, or fatigue  RESPIRATORY: No cough, wheezing, chills or hemoptysis; No shortness of breath  CARDIOVASCULAR: No chest pain, palpitations, dizziness, or leg swelling  GASTROINTESTINAL: No abdominal or epigastric pain. No nausea, vomiting, or hematemesis; No diarrhea or constipation. No melena or hematochezia.  ENDOCRINE: No heat or cold intolerance; No hair loss      Vital Signs Last 24 Hrs  T(C): 36.9 (26 Mar 2021 05:32), Max: 36.9 (25 Mar 2021 23:51)  T(F): 98.4 (26 Mar 2021 05:32), Max: 98.5 (25 Mar 2021 23:51)  HR: 102 (26 Mar 2021 05:32) (89 - 115)  BP: 145/81 (26 Mar 2021 05:32) (122/75 - 145/81)  BP(mean): --  RR: 18 (26 Mar 2021 05:32) (18 - 18)  SpO2: 97% (26 Mar 2021 05:32) (97% - 98%)    PHYSICAL EXAM:  GENERAL: NAD, well-groomed, well-developed      LABS:    03-26    138  |  103  |  18  ----------------------------<  330<H>  4.6   |  20<L>  |  1.00    Ca    9.5      26 Mar 2021 07:48          CAPILLARY BLOOD GLUCOSE      POCT Blood Glucose.: 299 mg/dL (26 Mar 2021 07:20)  POCT Blood Glucose.: 308 mg/dL (25 Mar 2021 21:26)  POCT Blood Glucose.: 256 mg/dL (25 Mar 2021 15:49)  POCT Blood Glucose.: 295 mg/dL (25 Mar 2021 11:16)    Lipid panel:               RADIOLOGY & ADDITIONAL TESTS:

## 2021-03-26 NOTE — DISCHARGE NOTE PROVIDER - PROVIDER TOKENS
FREE:[LAST:[Primary Care Provider],PHONE:[(   )    -],FAX:[(   )    -],FOLLOWUP:[1 week]],FREE:[LAST:[Endocrinology],PHONE:[(   )    -],FAX:[(   )    -]]

## 2021-03-26 NOTE — DISCHARGE NOTE PROVIDER - NSDCCPCAREPLAN_GEN_ALL_CORE_FT
PRINCIPAL DISCHARGE DIAGNOSIS  Diagnosis: DKA (diabetic ketoacidoses)  Assessment and Plan of Treatment: Resolved.   Continue 30u of insulin daily at bedtime and 10units three times a day premeal for glucose control.      SECONDARY DISCHARGE DIAGNOSES  Diagnosis: Hyperglycemic crisis in diabetes mellitus  Assessment and Plan of Treatment: Resolved. Continue 30u of insulin daily at bedtime and 10units three times a day premeal for glucose control.

## 2021-03-26 NOTE — PROGRESS NOTE ADULT - PROVIDER SPECIALTY LIST ADULT
Critical Care
Nephrology
Neurology
Critical Care
Hospitalist
Hospitalist
Neurology
Critical Care
Hospitalist
Endocrinology
Hospitalist
Endocrinology

## 2021-03-26 NOTE — PROGRESS NOTE ADULT - PROBLEM SELECTOR PLAN 1
increase lantus to 30units  increase lispro to 10units with meals   add metformin 500mg bid    in view of no insurance, and hx of poor diet, now aware of dietary changes needed and eager to improve, can try d/c on metformn 1g bid and glimpeiride 2mg bid  will need glucometer and supplies as well  f/u with endo within 2wks

## 2021-03-26 NOTE — DISCHARGE NOTE PROVIDER - CARE PROVIDER_API CALL
Primary Care Provider,   Phone: (   )    -  Fax: (   )    -  Follow Up Time: 1 week    Endocrinology,   Phone: (   )    -  Fax: (   )    -  Follow Up Time:

## 2021-03-26 NOTE — DISCHARGE NOTE PROVIDER - NSDCMRMEDTOKEN_GEN_ALL_CORE_FT
cyclobenzaprine 5 mg oral tablet: 1 tab(s) orally 2 times a day, As Needed -for muscle spasm will caise drowiness   mg oral tablet: 1 tab(s) orally 4 times a day, As Needed -for moderate pain   Basaglar KwikPen 100 units/mL subcutaneous solution: 30 unit(s) subcutaneous once a day (at bedtime)   cyclobenzaprine 5 mg oral tablet: 1 tab(s) orally 2 times a day, As Needed -for muscle spasm will caise drowiness  HumaLOG KwikPen 100 units/mL injectable solution: 10 unit(s) injectable 3 times a day (before meals)    mg oral tablet: 1 tab(s) orally 4 times a day, As Needed -for moderate pain

## 2021-03-26 NOTE — PROGRESS NOTE ADULT - PROBLEM SELECTOR PROBLEM 1
Diabetic ketoacidosis with coma associated with type 2 diabetes mellitus
Diabetic ketoacidosis with coma associated with type 2 diabetes mellitus
Diabetic ketoacidosis without coma associated with type 1 diabetes mellitus
Hyperglycemic crisis in diabetes mellitus

## 2022-02-11 NOTE — H&P ADULT - NSHPPOAPULMEMBOLUS_GEN_A_CORE
[facility]  [address]  [place] [address]  Phone: [phone]  Fax: [phone]   February 11, 2022    Donnell Brown MD  [address] [place] [address]  [address]  [place] [address]    Patient: María Elena Gutierres   MR Number: [id number]   YOB: 1980   Date of Visit: 2/11/2022       Dear Dr. Brown:    Thank you for referring María Elena Gutierres to me for evaluation. Here is my assessment and plan of care:    Assessment/      ICD-10-CM ICD-9-CM    1. Primary open angle glaucoma (POAG) of right eye, severe stage  H40.1113 365.11 iop improved OD on Diamox 750mg     365.73    2. Primary open angle glaucoma (POAG) of left eye, severe stage  H40.1123 365.11 Controlled post Ahemd     365.73    3. Iritis, chronic - Both Eyes  H20.10 364.10 stable   4. Polyarticular juvenile rheumatoid arthritis, chronic or unspecified  M08.3 714.30    5. Pseudophakia of both eyes  Z96.1 V43.1        RETURN TO CLINIC with [facility] next week and 10 days with me   Recommend  surgery be performed at [facility] if needed.   Combigan BID OD   Azopt TID OD   Rhopressa QD OD   Pred BID OS  DMX 750mg   IV Remicade     Below you will find my full exam findings. If you have questions, please do not hesitate to call me. I look forward to following Ms. María Elena Gutierres along with you.    Sincerely,        Naif Pink MD       CC  No Recipients             Base Eye Exam     Visual Acuity (Snellen - Linear)       Right Left    Dist cc 20/40 20/40    Dist ph cc 20/30 20/30 +2          Tonometry (Applanation, 9:20 AM)       Right Left    Pressure 8 4          Neuro/Psych     Oriented x3: Yes    Mood/Affect: Normal            Slit Lamp and Fundus Exam     External Exam       Right Left    External ptosis  ptosis           Slit Lamp Exam       Right Left    Lids/Lashes Normal Normal    Conjunctiva/Sclera 2+ Injection Superior-temporal Ahmed valve    Cornea Clear 1-2+ faint Punctate  epithelial erosions    Anterior Chamber Cell: Rare, 2+ Flare  2+ Flare, 1+ Cell: Occasional    Iris Round and reactive Peripheral iridectomy at 11 o'clock    Lens Posterior chamber intraocular lens Posterior chamber intraocular lens                no

## 2025-01-09 NOTE — ED PROVIDER NOTE - MUSCULOSKELETAL [+], MLM
Denies known Latex allergy or symptoms of Latex sensitivity.  Respiratory/Sleep medications, allergies and tobacco use reviewed.    Social History     Tobacco Use   Smoking Status Former    Current packs/day: 0.00    Average packs/day: 0.5 packs/day for 41.4 years (20.7 ttl pk-yrs)    Types: Cigarettes    Start date:     Quit date: 2020    Years since quittin.6   Smokeless Tobacco Never   Tobacco Comments    previously quit 20.  Restarted 2022.    Pt occasionally smokes        PCP:  Christiano Mathias MD Delores Gandy is a 70 year old female presenting for Shortness of breath consult referred by Christiano Mathias MD Delores Gandy gave permission to allow  marquise to be present in the room during the entire office visit.   Patient would like communication of their results via:      Home Phone: [phone] (home)  Okay to leave a message containing results? Yes        Flu vaccine: 24      Information updated/reviewed 25     NECK PAIN

## 2025-05-17 ENCOUNTER — INPATIENT (INPATIENT)
Facility: HOSPITAL | Age: 30
LOS: 1 days | Discharge: HOME | End: 2025-05-19
Attending: STUDENT IN AN ORGANIZED HEALTH CARE EDUCATION/TRAINING PROGRAM | Admitting: STUDENT IN AN ORGANIZED HEALTH CARE EDUCATION/TRAINING PROGRAM
Payer: COMMERCIAL

## 2025-05-17 VITALS
OXYGEN SATURATION: 99 % | WEIGHT: 250 LBS | TEMPERATURE: 98 F | HEART RATE: 77 BPM | RESPIRATION RATE: 17 BRPM | SYSTOLIC BLOOD PRESSURE: 136 MMHG | DIASTOLIC BLOOD PRESSURE: 96 MMHG

## 2025-05-17 LAB
ACETONE SERPL-MCNC: ABNORMAL
ALBUMIN SERPL ELPH-MCNC: 2.9 G/DL — LOW (ref 3.3–5)
ALP SERPL-CCNC: 67 U/L — SIGNIFICANT CHANGE UP (ref 40–120)
ALT FLD-CCNC: 22 U/L — SIGNIFICANT CHANGE UP (ref 12–78)
ANION GAP SERPL CALC-SCNC: 5 MMOL/L — SIGNIFICANT CHANGE UP (ref 5–17)
APPEARANCE UR: CLEAR — SIGNIFICANT CHANGE UP
AST SERPL-CCNC: 18 U/L — SIGNIFICANT CHANGE UP (ref 15–37)
BASOPHILS # BLD AUTO: 0.04 K/UL — SIGNIFICANT CHANGE UP (ref 0–0.2)
BASOPHILS NFR BLD AUTO: 0.6 % — SIGNIFICANT CHANGE UP (ref 0–2)
BILIRUB SERPL-MCNC: 0.6 MG/DL — SIGNIFICANT CHANGE UP (ref 0.2–1.2)
BILIRUB UR-MCNC: NEGATIVE — SIGNIFICANT CHANGE UP
BUN SERPL-MCNC: 15 MG/DL — SIGNIFICANT CHANGE UP (ref 7–23)
CALCIUM SERPL-MCNC: 6.7 MG/DL — LOW (ref 8.5–10.1)
CHLORIDE SERPL-SCNC: 121 MMOL/L — HIGH (ref 96–108)
CO2 SERPL-SCNC: 18 MMOL/L — LOW (ref 22–31)
COLOR SPEC: YELLOW — SIGNIFICANT CHANGE UP
CREAT SERPL-MCNC: 0.93 MG/DL — SIGNIFICANT CHANGE UP (ref 0.5–1.3)
DIFF PNL FLD: NEGATIVE — SIGNIFICANT CHANGE UP
EGFR: 114 ML/MIN/1.73M2 — SIGNIFICANT CHANGE UP
EGFR: 114 ML/MIN/1.73M2 — SIGNIFICANT CHANGE UP
EOSINOPHIL # BLD AUTO: 0.09 K/UL — SIGNIFICANT CHANGE UP (ref 0–0.5)
EOSINOPHIL NFR BLD AUTO: 1.3 % — SIGNIFICANT CHANGE UP (ref 0–6)
GAS PNL BLDV: SIGNIFICANT CHANGE UP
GLUCOSE BLDC GLUCOMTR-MCNC: 224 MG/DL — HIGH (ref 70–99)
GLUCOSE BLDC GLUCOMTR-MCNC: 258 MG/DL — HIGH (ref 70–99)
GLUCOSE SERPL-MCNC: 284 MG/DL — HIGH (ref 70–99)
GLUCOSE UR QL: >=1000 MG/DL
HCT VFR BLD CALC: 40.2 % — SIGNIFICANT CHANGE UP (ref 39–50)
HGB BLD-MCNC: 13 G/DL — SIGNIFICANT CHANGE UP (ref 13–17)
IMM GRANULOCYTES NFR BLD AUTO: 0.4 % — SIGNIFICANT CHANGE UP (ref 0–0.9)
KETONES UR QL: 80 MG/DL
LEUKOCYTE ESTERASE UR-ACNC: NEGATIVE — SIGNIFICANT CHANGE UP
LYMPHOCYTES # BLD AUTO: 2.11 K/UL — SIGNIFICANT CHANGE UP (ref 1–3.3)
LYMPHOCYTES # BLD AUTO: 29.3 % — SIGNIFICANT CHANGE UP (ref 13–44)
MCHC RBC-ENTMCNC: 26.5 PG — LOW (ref 27–34)
MCHC RBC-ENTMCNC: 32.3 G/DL — SIGNIFICANT CHANGE UP (ref 32–36)
MCV RBC AUTO: 81.9 FL — SIGNIFICANT CHANGE UP (ref 80–100)
MONOCYTES # BLD AUTO: 0.58 K/UL — SIGNIFICANT CHANGE UP (ref 0–0.9)
MONOCYTES NFR BLD AUTO: 8.1 % — SIGNIFICANT CHANGE UP (ref 2–14)
NEUTROPHILS # BLD AUTO: 4.35 K/UL — SIGNIFICANT CHANGE UP (ref 1.8–7.4)
NEUTROPHILS NFR BLD AUTO: 60.3 % — SIGNIFICANT CHANGE UP (ref 43–77)
NITRITE UR-MCNC: NEGATIVE — SIGNIFICANT CHANGE UP
NRBC BLD AUTO-RTO: 0 /100 WBCS — SIGNIFICANT CHANGE UP (ref 0–0)
PH UR: 5.5 — SIGNIFICANT CHANGE UP (ref 5–8)
PLATELET # BLD AUTO: 179 K/UL — SIGNIFICANT CHANGE UP (ref 150–400)
POTASSIUM SERPL-MCNC: 3.4 MMOL/L — LOW (ref 3.5–5.3)
POTASSIUM SERPL-SCNC: 3.4 MMOL/L — LOW (ref 3.5–5.3)
PROT SERPL-MCNC: 5.9 GM/DL — LOW (ref 6–8.3)
PROT UR-MCNC: 100 MG/DL
RBC # BLD: 4.91 M/UL — SIGNIFICANT CHANGE UP (ref 4.2–5.8)
RBC # FLD: 13.2 % — SIGNIFICANT CHANGE UP (ref 10.3–14.5)
SODIUM SERPL-SCNC: 144 MMOL/L — SIGNIFICANT CHANGE UP (ref 135–145)
SP GR SPEC: >1.03 — HIGH (ref 1–1.03)
UROBILINOGEN FLD QL: 0.2 MG/DL — SIGNIFICANT CHANGE UP (ref 0.2–1)
WBC # BLD: 7.2 K/UL — SIGNIFICANT CHANGE UP (ref 3.8–10.5)
WBC # FLD AUTO: 7.2 K/UL — SIGNIFICANT CHANGE UP (ref 3.8–10.5)

## 2025-05-17 PROCEDURE — 99285 EMERGENCY DEPT VISIT HI MDM: CPT

## 2025-05-17 PROCEDURE — 99222 1ST HOSP IP/OBS MODERATE 55: CPT

## 2025-05-17 RX ORDER — DEXTROSE 50 % IN WATER 50 %
25 SYRINGE (ML) INTRAVENOUS ONCE
Refills: 0 | Status: DISCONTINUED | OUTPATIENT
Start: 2025-05-17 | End: 2025-05-19

## 2025-05-17 RX ORDER — MELATONIN 5 MG
3 TABLET ORAL AT BEDTIME
Refills: 0 | Status: DISCONTINUED | OUTPATIENT
Start: 2025-05-17 | End: 2025-05-19

## 2025-05-17 RX ORDER — INSULIN GLARGINE-YFGN 100 [IU]/ML
21 INJECTION, SOLUTION SUBCUTANEOUS AT BEDTIME
Refills: 0 | Status: DISCONTINUED | OUTPATIENT
Start: 2025-05-17 | End: 2025-05-18

## 2025-05-17 RX ORDER — SODIUM CHLORIDE 9 G/1000ML
1000 INJECTION, SOLUTION INTRAVENOUS
Refills: 0 | Status: DISCONTINUED | OUTPATIENT
Start: 2025-05-17 | End: 2025-05-19

## 2025-05-17 RX ORDER — INSULIN LISPRO 100 U/ML
INJECTION, SOLUTION INTRAVENOUS; SUBCUTANEOUS AT BEDTIME
Refills: 0 | Status: DISCONTINUED | OUTPATIENT
Start: 2025-05-17 | End: 2025-05-19

## 2025-05-17 RX ORDER — ENOXAPARIN SODIUM 100 MG/ML
40 INJECTION SUBCUTANEOUS EVERY 24 HOURS
Refills: 0 | Status: DISCONTINUED | OUTPATIENT
Start: 2025-05-17 | End: 2025-05-19

## 2025-05-17 RX ORDER — GLUCAGON 3 MG/1
1 POWDER NASAL ONCE
Refills: 0 | Status: DISCONTINUED | OUTPATIENT
Start: 2025-05-17 | End: 2025-05-19

## 2025-05-17 RX ORDER — INSULIN LISPRO 100 U/ML
INJECTION, SOLUTION INTRAVENOUS; SUBCUTANEOUS
Refills: 0 | Status: DISCONTINUED | OUTPATIENT
Start: 2025-05-17 | End: 2025-05-19

## 2025-05-17 RX ORDER — DEXTROSE 50 % IN WATER 50 %
15 SYRINGE (ML) INTRAVENOUS ONCE
Refills: 0 | Status: DISCONTINUED | OUTPATIENT
Start: 2025-05-17 | End: 2025-05-19

## 2025-05-17 RX ORDER — DEXTROSE 50 % IN WATER 50 %
12.5 SYRINGE (ML) INTRAVENOUS ONCE
Refills: 0 | Status: DISCONTINUED | OUTPATIENT
Start: 2025-05-17 | End: 2025-05-19

## 2025-05-17 RX ORDER — INSULIN LISPRO 100 U/ML
7 INJECTION, SOLUTION INTRAVENOUS; SUBCUTANEOUS
Refills: 0 | Status: DISCONTINUED | OUTPATIENT
Start: 2025-05-17 | End: 2025-05-19

## 2025-05-17 RX ADMIN — Medication 1000 MILLILITER(S): at 13:47

## 2025-05-17 RX ADMIN — INSULIN LISPRO 1: 100 INJECTION, SOLUTION INTRAVENOUS; SUBCUTANEOUS at 23:07

## 2025-05-17 RX ADMIN — Medication 1000 MILLILITER(S): at 15:10

## 2025-05-17 RX ADMIN — INSULIN LISPRO 7 UNIT(S): 100 INJECTION, SOLUTION INTRAVENOUS; SUBCUTANEOUS at 16:29

## 2025-05-17 RX ADMIN — Medication 1000 MILLILITER(S): at 16:25

## 2025-05-17 RX ADMIN — INSULIN LISPRO 2: 100 INJECTION, SOLUTION INTRAVENOUS; SUBCUTANEOUS at 16:30

## 2025-05-17 RX ADMIN — INSULIN GLARGINE-YFGN 21 UNIT(S): 100 INJECTION, SOLUTION SUBCUTANEOUS at 23:07

## 2025-05-17 RX ADMIN — Medication 100 MILLIGRAM(S): at 23:06

## 2025-05-17 RX ADMIN — ENOXAPARIN SODIUM 40 MILLIGRAM(S): 100 INJECTION SUBCUTANEOUS at 23:10

## 2025-05-17 RX ADMIN — Medication 1000 MILLILITER(S): at 14:37

## 2025-05-17 NOTE — H&P ADULT - ASSESSMENT
29 year old male with history of T2DM  previously on insulin, who now presents to the hospital with increased thirst, weakness, found to be severely hyperglycemic, admitted for further management.,    #T2DM with Hyperglycemia  - presented with classical symptoms of diabetes (increased thirst, losing weight, increased urinary frequency)  - previously on insulin about 4 years ago but was taken off of it because he had lost a lot of weight  - previous a1c>12  - acidotic on BMP but no gap, and only minor acetone in serum, likely has starvation ketosis. no s/s of HHS either  - UA without infection, no fevers, no other infectious symptoms  - will treat as severe hyperglycemia - start lantus 21 and ademolog 7 TID (previously on upwards of 50)-  - f/u a1c, nutrition consult  - obtain lipids for AM    #ETC   - Diet  - cc diet  - dvt ppx - Lovenox 50 qD  - dispo - pending improvement of sugars and a stable regimen - likely dc 5/18 vs 5/19

## 2025-05-17 NOTE — ED PROVIDER NOTE - OBJECTIVE STATEMENT
30 y/o M hx of DM in the past presents w/ hyperglycemia 30 y/o M hx of DM in the past presents w/ hyperglycemia. states he used to be on insulin in 2021 when he had DKA and admitted to hospital. states he was taken off insulin by his doctors after 4-5 months. endorsing increased thirst, generalized weakness, increased urination. denies chest pain/sob. denies fever/chills. denies trauma/falls. denies dysuria. Denies abdominal pain. has not seen endocrinologist in years and does not have PCP either at this time.

## 2025-05-17 NOTE — ED ADULT NURSE NOTE - BEFAST SCREENING
Chief Complaint   Patient presents with    Anxiety    COPD       Have you seen any other physician or provider since your last visit yes - pulmonologist  ,     Have you had any other diagnostic tests since your last visit? no    Have you changed or stopped any medications since your last visit? yes - she stopped the sildenafil, and  Tyvaso, started trazodone from card.      I Negative

## 2025-05-17 NOTE — ED PROVIDER NOTE - PHYSICAL EXAMINATION
Detail Level: Simple General: Well appearing female in no acute distress  HEENT: Normocephalic, atraumatic. Moist mucous membranes. Oropharynx clear. No lymphadenopathy.  Eyes: No scleral icterus. EOMI. JYOTI.  Neck:. Soft and supple. Full ROM without pain. No midline tenderness  Cardiac: Regular rate and regular rhythm. No murmurs, rubs, gallops. Peripheral pulses 2+ and symmetric. No LE edema.  Resp: Lungs CTAB. Speaking in full sentences. No wheezes, rales or rhonchi.  Abd: Soft, non-tender, non-distended. No guarding or rebound. No scars, masses, or lesions.  Back: Spine midline and non-tender. No CVA tenderness.    Skin: No rashes, abrasions, or lacerations.  Neuro: AO x 3. Moves all extremities symmetrically. Motor strength and sensation grossly intact.

## 2025-05-17 NOTE — H&P ADULT - NSHPPHYSICALEXAM_GEN_ALL_CORE
GENERAL: NAD, well-developed  HEAD:  Atraumatic, Normocephalic  EYES: EOMI, PERRLA, conjunctiva and sclera clear  ENMT - dry oral mucosa   NECK: Supple, No JVD  CHEST/LUNG: Clear to auscultation bilaterally; No wheeze  HEART: Regular rate and rhythm; No murmurs, rubs, or gallops  ABDOMEN: Soft, Nontender, Nondistended; Bowel sounds present  EXTREMITIES:  2+ Peripheral Pulses, No clubbing, cyanosis, or edema  PSYCH: AAOx3, appropriate affect  NEUROLOGY: non-focal, manzo  SKIN: No rashes or lesions

## 2025-05-17 NOTE — H&P ADULT - HISTORY OF PRESENT ILLNESS
Patient is a 29 year old male with history of T2DM  previously on insulin, who now presents to the hospital with increased thirst, weakness. Patient has been in his usual state of health but the past few days she has been having increased thirst, significant weakness, and increased urinary frequency. States that for the past month, he has been having decreased sleep, increased stress at work and also not eating healthy. Otherwise has no confusion, is alert, has no recent fever, but endorses myalgias. Denies any nausea, vomiting, abdominal pain, fever, cough.  Has not seen a doctor in a number of years and does not take any medications daily.

## 2025-05-17 NOTE — ED ADULT NURSE NOTE - NSFALLUNIVINTERV_ED_ALL_ED
Bed/Stretcher in lowest position, wheels locked, appropriate side rails in place/Call bell, personal items and telephone in reach/Instruct patient to call for assistance before getting out of bed/chair/stretcher/Non-slip footwear applied when patient is off stretcher/[place] to call system/Physically safe environment - no spills, clutter or unnecessary equipment/Purposeful proactive rounding/Room/bathroom lighting operational, light cord in reach

## 2025-05-17 NOTE — ED ADULT NURSE NOTE - ED CARDIAC RATE
1/9/2025     3:49 PM 2/12/2025     2:01 PM 5/14/2025     3:17 PM   PHQ   PHQ-9 Total Score 11 11  4   Q9: Thoughts of better off dead/self-harm past 2 weeks Not at all Not at all Not at all       Patient-reported       NATALIA Tovar CNP  Questions or concerns please feel free to send me a [employer] message or call me  Phone : [phone]     normal Cyclosporine Counseling:  I discussed with the patient the risks of cyclosporine including but not limited to hypertension, gingival hyperplasia,myelosuppression, immunosuppression, liver damage, kidney damage, neurotoxicity, lymphoma, and serious infections. The patient understands that monitoring is required including baseline blood pressure, CBC, CMP, lipid panel and uric acid, and then 1-2 times monthly CMP and blood pressure.

## 2025-05-17 NOTE — ED PROVIDER NOTE - CLINICAL SUMMARY MEDICAL DECISION MAKING FREE TEXT BOX
28 y/o M hx of DM in the past presents w/ hyperglycemia. states he used to be on insulin in 2021 when he had DKA and admitted to hospital. states he was taken off insulin by his doctors after 4-5 months. endorsing increased thirst, generalized weakness, increased urination. denies chest pain/sob. denies fever/chills. denies trauma/falls. denies dysuria. Denies abdominal pain. has not seen endocrinologist in years and does not have PCP either at this time.   FSG elevated  check for ketones, anion gap.  low suspicion of dka/HHS  likely uncontrolled hyperglycemia , not on ANY medications currently/ w/ no no f/u.    mildly acidotic w/ bicarb slightly decreased. admit for hyperglycemia - patient has no immediate f/u. can benefit from endocrine/medicine eval for diabetes regimen for safe home discharge.

## 2025-05-17 NOTE — ED ADULT NURSE NOTE - NSFALLLASTSIX_ED_ALL_ED
Called to schedule a Car [facility] visit with patient and Steff Ryan NP for Mid day Monday 5/13. MANINDER for a call back.    Fang  [phone]  
No.

## 2025-05-17 NOTE — ED ADULT TRIAGE NOTE - CHIEF COMPLAINT QUOTE
BIBA patient was taken off of insulin two years ago because he lost a lot of weight. Patient states that he has been extremely thirsty, decreased appetite, and increased urination  x three days. 550cc NS given to 20G L hand by EMS

## 2025-05-18 LAB
A1C WITH ESTIMATED AVERAGE GLUCOSE RESULT: 10.5 % — HIGH (ref 4–5.6)
ANION GAP SERPL CALC-SCNC: 5 MMOL/L — SIGNIFICANT CHANGE UP (ref 5–17)
BUN SERPL-MCNC: 13 MG/DL — SIGNIFICANT CHANGE UP (ref 7–23)
CALCIUM SERPL-MCNC: 9.1 MG/DL — SIGNIFICANT CHANGE UP (ref 8.5–10.1)
CHLORIDE SERPL-SCNC: 112 MMOL/L — HIGH (ref 96–108)
CHOLEST SERPL-MCNC: 178 MG/DL — SIGNIFICANT CHANGE UP
CO2 SERPL-SCNC: 24 MMOL/L — SIGNIFICANT CHANGE UP (ref 22–31)
CREAT SERPL-MCNC: 1.19 MG/DL — SIGNIFICANT CHANGE UP (ref 0.5–1.3)
EGFR: 85 ML/MIN/1.73M2 — SIGNIFICANT CHANGE UP
EGFR: 85 ML/MIN/1.73M2 — SIGNIFICANT CHANGE UP
ESTIMATED AVERAGE GLUCOSE: 255 MG/DL — HIGH (ref 68–114)
GLUCOSE BLDC GLUCOMTR-MCNC: 242 MG/DL — HIGH (ref 70–99)
GLUCOSE BLDC GLUCOMTR-MCNC: 291 MG/DL — HIGH (ref 70–99)
GLUCOSE BLDC GLUCOMTR-MCNC: 338 MG/DL — HIGH (ref 70–99)
GLUCOSE BLDC GLUCOMTR-MCNC: 339 MG/DL — HIGH (ref 70–99)
GLUCOSE BLDC GLUCOMTR-MCNC: 351 MG/DL — HIGH (ref 70–99)
GLUCOSE SERPL-MCNC: 274 MG/DL — HIGH (ref 70–99)
HDLC SERPL-MCNC: 26 MG/DL — LOW
LDLC SERPL-MCNC: 122 MG/DL — HIGH
LIPID PNL WITH DIRECT LDL SERPL: 122 MG/DL — HIGH
MAGNESIUM SERPL-MCNC: 2.5 MG/DL — SIGNIFICANT CHANGE UP (ref 1.6–2.6)
NONHDLC SERPL-MCNC: 152 MG/DL — HIGH
PHOSPHATE SERPL-MCNC: 3 MG/DL — SIGNIFICANT CHANGE UP (ref 2.5–4.5)
POTASSIUM SERPL-MCNC: 4.2 MMOL/L — SIGNIFICANT CHANGE UP (ref 3.5–5.3)
POTASSIUM SERPL-SCNC: 4.2 MMOL/L — SIGNIFICANT CHANGE UP (ref 3.5–5.3)
SODIUM SERPL-SCNC: 141 MMOL/L — SIGNIFICANT CHANGE UP (ref 135–145)
TRIGL SERPL-MCNC: 165 MG/DL — HIGH

## 2025-05-18 PROCEDURE — 99233 SBSQ HOSP IP/OBS HIGH 50: CPT

## 2025-05-18 RX ORDER — ATORVASTATIN CALCIUM 80 MG/1
20 TABLET, FILM COATED ORAL AT BEDTIME
Refills: 0 | Status: DISCONTINUED | OUTPATIENT
Start: 2025-05-18 | End: 2025-05-19

## 2025-05-18 RX ORDER — INSULIN GLARGINE-YFGN 100 [IU]/ML
30 INJECTION, SOLUTION SUBCUTANEOUS AT BEDTIME
Refills: 0 | Status: DISCONTINUED | OUTPATIENT
Start: 2025-05-18 | End: 2025-05-19

## 2025-05-18 RX ORDER — ACETAMINOPHEN 500 MG/5ML
650 LIQUID (ML) ORAL EVERY 6 HOURS
Refills: 0 | Status: DISCONTINUED | OUTPATIENT
Start: 2025-05-18 | End: 2025-05-19

## 2025-05-18 RX ORDER — LOSARTAN POTASSIUM 100 MG/1
25 TABLET, FILM COATED ORAL DAILY
Refills: 0 | Status: DISCONTINUED | OUTPATIENT
Start: 2025-05-18 | End: 2025-05-19

## 2025-05-18 RX ADMIN — Medication 650 MILLIGRAM(S): at 08:55

## 2025-05-18 RX ADMIN — INSULIN LISPRO 5: 100 INJECTION, SOLUTION INTRAVENOUS; SUBCUTANEOUS at 12:13

## 2025-05-18 RX ADMIN — INSULIN LISPRO 3: 100 INJECTION, SOLUTION INTRAVENOUS; SUBCUTANEOUS at 17:32

## 2025-05-18 RX ADMIN — ENOXAPARIN SODIUM 40 MILLIGRAM(S): 100 INJECTION SUBCUTANEOUS at 22:43

## 2025-05-18 RX ADMIN — INSULIN GLARGINE-YFGN 30 UNIT(S): 100 INJECTION, SOLUTION SUBCUTANEOUS at 22:33

## 2025-05-18 RX ADMIN — INSULIN LISPRO 7 UNIT(S): 100 INJECTION, SOLUTION INTRAVENOUS; SUBCUTANEOUS at 17:32

## 2025-05-18 RX ADMIN — ATORVASTATIN CALCIUM 20 MILLIGRAM(S): 80 TABLET, FILM COATED ORAL at 22:39

## 2025-05-18 RX ADMIN — Medication 650 MILLIGRAM(S): at 09:55

## 2025-05-18 RX ADMIN — INSULIN LISPRO 2: 100 INJECTION, SOLUTION INTRAVENOUS; SUBCUTANEOUS at 08:29

## 2025-05-18 RX ADMIN — INSULIN LISPRO 2: 100 INJECTION, SOLUTION INTRAVENOUS; SUBCUTANEOUS at 22:33

## 2025-05-18 RX ADMIN — INSULIN LISPRO 7 UNIT(S): 100 INJECTION, SOLUTION INTRAVENOUS; SUBCUTANEOUS at 08:30

## 2025-05-18 RX ADMIN — INSULIN LISPRO 7 UNIT(S): 100 INJECTION, SOLUTION INTRAVENOUS; SUBCUTANEOUS at 12:14

## 2025-05-18 RX ADMIN — LOSARTAN POTASSIUM 25 MILLIGRAM(S): 100 TABLET, FILM COATED ORAL at 12:58

## 2025-05-18 NOTE — PATIENT PROFILE ADULT - FUNCTIONAL ASSESSMENT - BASIC MOBILITY 6.
Prior Authorization Retail Medication Request    Medication/Dose: phentermine (ADIPEX-P) 37.5 MG capsule    ICD code (if different than what is on RX):  Weight gain [R63.5]     Previously Tried and Failed:    Rationale:     Insurance Name: [employer]  Insurance ID:  [id number]       Pharmacy Information (if different than what is on RX)  Name:    Phone:      4-calculated by average/Not able to assess (calculate score using [facility] averaging method)

## 2025-05-18 NOTE — PATIENT PROFILE ADULT - NSPROPTRIGHTSUPPORTNAME_GEN_A_NUR
Hub to relay:    Please give patient the following message:     Your test results have some minor abnormalities that do not require any change in your treatment at this time.     Glucose and A1c blood levels are slightly elevated but have improved from 2 months ago.  Encourage you to continue making lifestyle changes and continue Metformin as prescribed.     Otherwise, all other blood levels are essentially within normal limits.  
Padmini Jo

## 2025-05-18 NOTE — PROGRESS NOTE ADULT - SUBJECTIVE AND OBJECTIVE BOX
Patient is a 29y old  Male who presents with a chief complaint of hyperglycemia (17 May 2025 16:00)    INTERVAL HPI/OVERNIGHT EVENTS: Patients seen and examined at bedside this morning. No acute events overnight. Pt reports    MEDICATIONS  (STANDING):  dextrose 5%. 1000 milliLiter(s) (50 mL/Hr) IV Continuous <Continuous>  dextrose 5%. 1000 milliLiter(s) (100 mL/Hr) IV Continuous <Continuous>  dextrose 50% Injectable 25 Gram(s) IV Push once  dextrose 50% Injectable 12.5 Gram(s) IV Push once  dextrose 50% Injectable 25 Gram(s) IV Push once  enoxaparin Injectable 40 milliGRAM(s) SubCutaneous every 24 hours  glucagon  Injectable 1 milliGRAM(s) IntraMuscular once  insulin glargine Injectable (LANTUS) 21 Unit(s) SubCutaneous at bedtime  insulin lispro (ADMELOG) corrective regimen sliding scale   SubCutaneous three times a day before meals  insulin lispro (ADMELOG) corrective regimen sliding scale   SubCutaneous at bedtime  insulin lispro Injectable (ADMELOG) 7 Unit(s) SubCutaneous three times a day before meals    MEDICATIONS  (PRN):  acetaminophen     Tablet .. 650 milliGRAM(s) Oral every 6 hours PRN Temp greater or equal to 38C (100.4F), Mild Pain (1 - 3)  dextrose Oral Gel 15 Gram(s) Oral once PRN Blood Glucose LESS THAN 70 milliGRAM(s)/deciliter  melatonin 3 milliGRAM(s) Oral at bedtime PRN Insomnia    Allergies    No Known Allergies    Intolerances      REVIEW OF SYSTEMS:  All other systems reviewed and are negative    Vital Signs Last 24 Hrs  T(C): 36.5 (18 May 2025 05:37), Max: 36.7 (17 May 2025 12:24)  T(F): 97.7 (18 May 2025 05:37), Max: 98.1 (17 May 2025 12:24)  HR: 66 (18 May 2025 06:31) (66 - 84)  BP: 145/95 (18 May 2025 06:31) (136/96 - 162/106)  BP(mean): --  RR: 18 (18 May 2025 05:37) (16 - 18)  SpO2: 99% (18 May 2025 05:37) (97% - 99%)    Parameters below as of 18 May 2025 05:37  Patient On (Oxygen Delivery Method): room air      Daily Height in cm: 182.88 (17 May 2025 18:35)    Daily   I&O's Summary    CAPILLARY BLOOD GLUCOSE      POCT Blood Glucose.: 242 mg/dL (18 May 2025 08:01)  POCT Blood Glucose.: 258 mg/dL (17 May 2025 22:57)  POCT Blood Glucose.: 224 mg/dL (17 May 2025 18:40)  POCT Blood Glucose.: 240 mg/dL (17 May 2025 16:10)  POCT Blood Glucose.: 311 mg/dL (17 May 2025 15:03)  POCT Blood Glucose.: 398 mg/dL (17 May 2025 12:31)  POCT Blood Glucose.: 358 mg/dL (17 May 2025 12:30)    Physical Exam: GENERAL: NAD, well-developed  HEAD:  Atraumatic, Normocephalic  EYES: EOMI, PERRLA, conjunctiva and sclera clear  ENMT - dry oral mucosa   NECK: Supple, No JVD  CHEST/LUNG: Clear to auscultation bilaterally; No wheeze  HEART: Regular rate and rhythm; No murmurs, rubs, or gallops  ABDOMEN: Soft, Nontender, Nondistended; Bowel sounds present  EXTREMITIES:  2+ Peripheral Pulses, No clubbing, cyanosis, or edema  PSYCH: AAOx3, appropriate affect  NEUROLOGY: non-focal, manzo  SKIN: No rashes or lesions      Labs                          13.0   7.20  )-----------( 179      ( 17 May 2025 13:30 )             40.2     05-18    141  |  112[H]  |  13  ----------------------------<  274[H]  4.2   |  24  |  1.19    Ca    9.1      18 May 2025 05:30  Phos  3.0     05-18  Mg     2.5     05-18    TPro  5.9[L]  /  Alb  2.9[L]  /  TBili  0.6  /  DBili  x   /  AST  18  /  ALT  22  /  AlkPhos  67  05-17          Urinalysis Basic - ( 18 May 2025 05:30 )    Color: x / Appearance: x / SG: x / pH: x  Gluc: 274 mg/dL / Ketone: x  / Bili: x / Urobili: x   Blood: x / Protein: x / Nitrite: x   Leuk Esterase: x / RBC: x / WBC x   Sq Epi: x / Non Sq Epi: x / Bacteria: x        Urinalysis with Rflx Culture (collected 17 May 2025 13:30)                    Radiology and Imaging reviewed. Patient is a 29y old  Male who presents with a chief complaint of hyperglycemia (17 May 2025 16:00)    INTERVAL HPI/OVERNIGHT EVENTS: Patients seen and examined at bedside this morning. No acute events overnight. Pt reports not being on medications for some time. Too busy with life and work lately to visit PCP.    MEDICATIONS  (STANDING):  dextrose 5%. 1000 milliLiter(s) (50 mL/Hr) IV Continuous <Continuous>  dextrose 5%. 1000 milliLiter(s) (100 mL/Hr) IV Continuous <Continuous>  dextrose 50% Injectable 25 Gram(s) IV Push once  dextrose 50% Injectable 12.5 Gram(s) IV Push once  dextrose 50% Injectable 25 Gram(s) IV Push once  enoxaparin Injectable 40 milliGRAM(s) SubCutaneous every 24 hours  glucagon  Injectable 1 milliGRAM(s) IntraMuscular once  insulin glargine Injectable (LANTUS) 21 Unit(s) SubCutaneous at bedtime  insulin lispro (ADMELOG) corrective regimen sliding scale   SubCutaneous three times a day before meals  insulin lispro (ADMELOG) corrective regimen sliding scale   SubCutaneous at bedtime  insulin lispro Injectable (ADMELOG) 7 Unit(s) SubCutaneous three times a day before meals    MEDICATIONS  (PRN):  acetaminophen     Tablet .. 650 milliGRAM(s) Oral every 6 hours PRN Temp greater or equal to 38C (100.4F), Mild Pain (1 - 3)  dextrose Oral Gel 15 Gram(s) Oral once PRN Blood Glucose LESS THAN 70 milliGRAM(s)/deciliter  melatonin 3 milliGRAM(s) Oral at bedtime PRN Insomnia    Allergies    No Known Allergies    Intolerances      REVIEW OF SYSTEMS:  All other systems reviewed and are negative    Vital Signs Last 24 Hrs  T(C): 36.5 (18 May 2025 05:37), Max: 36.7 (17 May 2025 12:24)  T(F): 97.7 (18 May 2025 05:37), Max: 98.1 (17 May 2025 12:24)  HR: 66 (18 May 2025 06:31) (66 - 84)  BP: 145/95 (18 May 2025 06:31) (136/96 - 162/106)  BP(mean): --  RR: 18 (18 May 2025 05:37) (16 - 18)  SpO2: 99% (18 May 2025 05:37) (97% - 99%)    Parameters below as of 18 May 2025 05:37  Patient On (Oxygen Delivery Method): room air      Daily Height in cm: 182.88 (17 May 2025 18:35)    Daily   I&O's Summary    CAPILLARY BLOOD GLUCOSE      POCT Blood Glucose.: 242 mg/dL (18 May 2025 08:01)  POCT Blood Glucose.: 258 mg/dL (17 May 2025 22:57)  POCT Blood Glucose.: 224 mg/dL (17 May 2025 18:40)  POCT Blood Glucose.: 240 mg/dL (17 May 2025 16:10)  POCT Blood Glucose.: 311 mg/dL (17 May 2025 15:03)  POCT Blood Glucose.: 398 mg/dL (17 May 2025 12:31)  POCT Blood Glucose.: 358 mg/dL (17 May 2025 12:30)    Physical Exam: GENERAL: NAD, well-developed  HEAD:  Atraumatic, Normocephalic  EYES: EOMI, PERRLA, conjunctiva and sclera clear  ENMT - dry oral mucosa   NECK: Supple, No JVD  CHEST/LUNG: Clear to auscultation bilaterally; No wheeze  HEART: Regular rate and rhythm; No murmurs, rubs, or gallops  ABDOMEN: Soft, Nontender, Nondistended; Bowel sounds present  EXTREMITIES:  2+ Peripheral Pulses, No clubbing, cyanosis, or edema  PSYCH: AAOx3, appropriate affect  NEUROLOGY: non-focal, manzo  SKIN: No rashes or lesions      Labs                          13.0   7.20  )-----------( 179      ( 17 May 2025 13:30 )             40.2     05-18    141  |  112[H]  |  13  ----------------------------<  274[H]  4.2   |  24  |  1.19    Ca    9.1      18 May 2025 05:30  Phos  3.0     05-18  Mg     2.5     05-18    TPro  5.9[L]  /  Alb  2.9[L]  /  TBili  0.6  /  DBili  x   /  AST  18  /  ALT  22  /  AlkPhos  67  05-17          Urinalysis Basic - ( 18 May 2025 05:30 )    Color: x / Appearance: x / SG: x / pH: x  Gluc: 274 mg/dL / Ketone: x  / Bili: x / Urobili: x   Blood: x / Protein: x / Nitrite: x   Leuk Esterase: x / RBC: x / WBC x   Sq Epi: x / Non Sq Epi: x / Bacteria: x        Urinalysis with Rflx Culture (collected 17 May 2025 13:30)                    Radiology and Imaging reviewed.

## 2025-05-18 NOTE — PROGRESS NOTE ADULT - ASSESSMENT
29 year old male with history of T2DM  previously on insulin, who now presents to the hospital with increased thirst, weakness, found to be severely hyperglycemic, admitted for further management.,    #T2DM with Hyperglycemia  - presented with classical symptoms of diabetes (increased thirst, losing weight, increased urinary frequency)  - previously on insulin about 4 years ago but was taken off of it because he had lost a lot of weight  - previous a1c>12  - acidotic on BMP but no gap, and only minor acetone in serum, likely has starvation ketosis. no s/s of HHS either  - UA without infection, no fevers, no other infectious symptoms  - will treat as severe hyperglycemia - start lantus 21 and ademolog 7 TID (previously on upwards of 50)-  - f/u a1c, nutrition consult  - obtain lipids    #ETC   - Diet  - cc diet  - dvt ppx - Lovenox 50 qD  - dispo - pending improvement of sugars and a stable regimen - likely dc 5/18 vs 5/19     29 year old male with history of T2DM  previously on insulin, who now presents to the hospital with increased thirst, weakness, found to be severely hyperglycemic, admitted for further management.,    #T2DM with Hyperglycemia  - presented with classical symptoms of diabetes (increased thirst, losing weight, increased urinary frequency)  - previously on insulin about 4 years ago but was taken off of it because he had lost a lot of weight  - previous a1c>12  - acidotic on BMP but no gap, and only minor acetone in serum, likely has starvation ketosis. no s/s of HHS either  - UA without infection, no fevers, no other infectious symptoms  - will treat as severe hyperglycemia - start lantus 21 and ademolog 7 TID (previously on upwards of 50)-  - a1c>10 , nutrition consult  - obtain lipids    #ETC   - Diet  - cc diet  - dvt ppx - Lovenox 50 qD  - dispo - pending improvement of sugars and a stable regimen

## 2025-05-19 VITALS
OXYGEN SATURATION: 98 % | SYSTOLIC BLOOD PRESSURE: 143 MMHG | RESPIRATION RATE: 18 BRPM | TEMPERATURE: 98 F | DIASTOLIC BLOOD PRESSURE: 90 MMHG | HEART RATE: 81 BPM

## 2025-05-19 LAB
GLUCOSE BLDC GLUCOMTR-MCNC: 250 MG/DL — HIGH (ref 70–99)
GLUCOSE BLDC GLUCOMTR-MCNC: 254 MG/DL — HIGH (ref 70–99)
GLUCOSE BLDC GLUCOMTR-MCNC: 278 MG/DL — HIGH (ref 70–99)

## 2025-05-19 PROCEDURE — 99238 HOSP IP/OBS DSCHRG MGMT 30/<: CPT

## 2025-05-19 PROCEDURE — 99239 HOSP IP/OBS DSCHRG MGMT >30: CPT

## 2025-05-19 RX ORDER — METFORMIN HYDROCHLORIDE 850 MG/1
1 TABLET ORAL
Qty: 60 | Refills: 0
Start: 2025-05-19

## 2025-05-19 RX ORDER — ATORVASTATIN CALCIUM 80 MG/1
1 TABLET, FILM COATED ORAL
Qty: 30 | Refills: 0
Start: 2025-05-19 | End: 2025-06-17

## 2025-05-19 RX ORDER — ISOPROPYL ALCOHOL, BENZOCAINE .7; .06 ML/ML; ML/ML
0 SWAB TOPICAL
Qty: 100 | Refills: 1
Start: 2025-05-19

## 2025-05-19 RX ORDER — LOSARTAN POTASSIUM 100 MG/1
1 TABLET, FILM COATED ORAL
Qty: 30 | Refills: 0
Start: 2025-05-19 | End: 2025-06-17

## 2025-05-19 RX ORDER — INSULIN GLARGINE-YFGN 100 [IU]/ML
30 INJECTION, SOLUTION SUBCUTANEOUS
Qty: 5 | Refills: 0
Start: 2025-05-19

## 2025-05-19 RX ORDER — METFORMIN HYDROCHLORIDE 850 MG/1
500 TABLET ORAL
Refills: 0 | Status: DISCONTINUED | OUTPATIENT
Start: 2025-05-19 | End: 2025-05-19

## 2025-05-19 RX ADMIN — INSULIN LISPRO 7 UNIT(S): 100 INJECTION, SOLUTION INTRAVENOUS; SUBCUTANEOUS at 16:18

## 2025-05-19 RX ADMIN — INSULIN LISPRO 3: 100 INJECTION, SOLUTION INTRAVENOUS; SUBCUTANEOUS at 11:38

## 2025-05-19 RX ADMIN — INSULIN LISPRO 7 UNIT(S): 100 INJECTION, SOLUTION INTRAVENOUS; SUBCUTANEOUS at 11:39

## 2025-05-19 RX ADMIN — LOSARTAN POTASSIUM 25 MILLIGRAM(S): 100 TABLET, FILM COATED ORAL at 05:41

## 2025-05-19 RX ADMIN — METFORMIN HYDROCHLORIDE 500 MILLIGRAM(S): 850 TABLET ORAL at 09:23

## 2025-05-19 RX ADMIN — INSULIN LISPRO 7 UNIT(S): 100 INJECTION, SOLUTION INTRAVENOUS; SUBCUTANEOUS at 08:29

## 2025-05-19 RX ADMIN — INSULIN LISPRO 3: 100 INJECTION, SOLUTION INTRAVENOUS; SUBCUTANEOUS at 08:13

## 2025-05-19 RX ADMIN — INSULIN LISPRO 2: 100 INJECTION, SOLUTION INTRAVENOUS; SUBCUTANEOUS at 16:18

## 2025-05-19 NOTE — DISCHARGE NOTE PROVIDER - ATTENDING DISCHARGE PHYSICAL EXAMINATION:
Physical Exam: GENERAL: NAD, well-developed  HEAD:  Atraumatic, Normocephalic  EYES: EOMI, PERRLA, conjunctiva and sclera clear  ENMT - dry oral mucosa   NECK: Supple, No JVD  CHEST/LUNG: Clear to auscultation bilaterally; No wheeze  HEART: Regular rate and rhythm; No murmurs, rubs, or gallops  ABDOMEN: Soft, Nontender, Nondistended; Bowel sounds present  EXTREMITIES:  2+ Peripheral Pulses, No clubbing, cyanosis, or edema  PSYCH: AAOx3, appropriate affect  NEUROLOGY: non-focal, manzo  SKIN: No rashes or lesions

## 2025-05-19 NOTE — DISCHARGE NOTE PROVIDER - NSDCCPCAREPLAN_GEN_ALL_CORE_FT
PRINCIPAL DISCHARGE DIAGNOSIS  Diagnosis: Hyperglycemia  Assessment and Plan of Treatment: Blood sugar downtrending. Insulin regimen adjusted

## 2025-05-19 NOTE — DISCHARGE NOTE PROVIDER - HOSPITAL COURSE
29 year old male with history of T2DM  previously on insulin, who now presents to the hospital with increased thirst, weakness, found to be severely hyperglycemic, admitted for further management.,    #T2DM with Hyperglycemia  - presented with classical symptoms of diabetes (increased thirst, losing weight, increased urinary frequency)  - previously on insulin about 4 years ago but was taken off of it because he had lost a lot of weight  - previous a1c>12  - acidotic on BMP but no gap, and only minor acetone in serum, likely has starvation ketosis. no s/s of HHS either  - UA without infection, no fevers, no other infectious symptoms  - will treat as severe hyperglycemia - start lantus 21 and ademolog 7 TID (previously on upwards of 50)-  - a1c>10 , nutrition consult  - obtain lipids    #ETC   - Diet  - cc diet  - dvt ppx - Lovenox 50 qD  - dispo - home with adjusted Insulin regimen   29 year old male with history of T2DM  previously on insulin, who now presents to the hospital with increased thirst, weakness, found to be severely hyperglycemic, admitted for further management.    #T2DM with Hyperglycemia  - presented with classical symptoms of diabetes (increased thirst, losing weight, increased urinary frequency)  - previously on insulin about 4 years ago but was taken off of it because he had lost a lot of weight  - previous a1c>12  - acidotic on BMP but no gap, and only minor acetone in serum, likely has starvation ketosis. no s/s of HHS either  - UA without infection, no fevers, no other infectious symptoms  - will treat as severe hyperglycemia - start lantus 21 and ademolog 7 TID (previously on upwards of 50)-  - a1c>10 , nutrition consult  - obtain lipids    #ETC   - Diet  - cc diet  - dvt ppx - Lovenox 50 qD  - dispo - home with adjusted Insulin regimen

## 2025-05-26 DIAGNOSIS — E11.65 TYPE 2 DIABETES MELLITUS WITH HYPERGLYCEMIA: ICD-10-CM

## 2025-05-26 DIAGNOSIS — E88.89 OTHER SPECIFIED METABOLIC DISORDERS: ICD-10-CM
